# Patient Record
Sex: FEMALE | Race: WHITE | NOT HISPANIC OR LATINO | Employment: OTHER | URBAN - METROPOLITAN AREA
[De-identification: names, ages, dates, MRNs, and addresses within clinical notes are randomized per-mention and may not be internally consistent; named-entity substitution may affect disease eponyms.]

---

## 2017-04-13 ENCOUNTER — TELEPHONE (OUTPATIENT)
Dept: UROLOGY | Facility: CLINIC | Age: 67
End: 2017-04-13

## 2017-04-13 NOTE — TELEPHONE ENCOUNTER
----- Message from Maki Hayes sent at 4/13/2017 12:22 PM CDT -----  New pt called stated that she is being referred by Dr. Teri Carmona/pt stated that she has blood in urine and trying to get in asap/did'nt know if I could schedule her with the NP because she's a new pt/pls call pt back to schedule [phone]

## 2017-04-13 NOTE — TELEPHONE ENCOUNTER
Attempted to contact patient no answer, unable able to leave message voicemail has not been set up

## 2017-04-18 ENCOUNTER — TELEPHONE (OUTPATIENT)
Dept: UROLOGY | Facility: CLINIC | Age: 67
End: 2017-04-18

## 2017-07-06 ENCOUNTER — OFFICE VISIT (OUTPATIENT)
Dept: HEMATOLOGY/ONCOLOGY | Facility: CLINIC | Age: 67
End: 2017-07-06
Payer: MEDICARE

## 2017-07-06 VITALS
WEIGHT: 237 LBS | TEMPERATURE: 98 F | RESPIRATION RATE: 18 BRPM | DIASTOLIC BLOOD PRESSURE: 86 MMHG | BODY MASS INDEX: 43.35 KG/M2 | SYSTOLIC BLOOD PRESSURE: 122 MMHG

## 2017-07-06 DIAGNOSIS — I26.99 OTHER ACUTE PULMONARY EMBOLISM WITHOUT ACUTE COR PULMONALE: Primary | ICD-10-CM

## 2017-07-06 DIAGNOSIS — D68.59 HYPERCOAGULATION SYNDROME: ICD-10-CM

## 2017-07-06 PROCEDURE — 1125F AMNT PAIN NOTED PAIN PRSNT: CPT | Mod: ,,, | Performed by: INTERNAL MEDICINE

## 2017-07-06 PROCEDURE — 99204 OFFICE O/P NEW MOD 45 MIN: CPT | Mod: ,,, | Performed by: INTERNAL MEDICINE

## 2017-07-06 PROCEDURE — 1159F MED LIST DOCD IN RCRD: CPT | Mod: ,,, | Performed by: INTERNAL MEDICINE

## 2017-07-06 NOTE — LETTER
July 7, 2017      Raquel Carmona, ALKA  [address]  [place] [address]           [facility]  [address] [place], [place] [address] [place] [address]          Patient: Leyda Bravo   MR Number: [id number]   YOB: 1950   Date of Visit: 7/6/2017       Dear Raquel Carmona:    Thank you for referring Leyda Bravo to me for evaluation. Attached you will find relevant portions of my assessment and plan of care.    If you have questions, please do not hesitate to call me. I look forward to following Leyda Bravo along with you.    Sincerely,    JUDSON Jacob MD    Enclosure  CC:  No Recipients    If you would like to receive this communication electronically, please contact [email or web address] or [phone] to request more information on [employer] access.    For providers and/or their staff who would like to refer a patient to [facility], please contact us through our one-stop-shop provider referral line, Darya Vazquez, at [phone].    If you feel you have received this communication in error or would no longer like to receive these types of communications, please e-mail [email or web address]

## 2017-07-07 NOTE — PROGRESS NOTES
JUDSON Jacob MD Physician Addendum   Consults       Expand All Collapse All    []Hide copied text  []Hover for attribution information                                                                                                                [facility] History & Physical     Subjective:      Patient ID:   Leyda Bravo  67 y.o. female   José Miguel,HEIDI        Chief Complaint:   PE hx, coag eval.     HPI:  67 y.o. female with diagnosis of colitis, had total colectomy at [facility] , course complicated by PE.     Hernia repair 2016, [facility], Dr. Spann, wound dehised.  Complicated by PE.     Presently on coumadin, Ms. Forde regulates it.     Hx also of C section x's 1, cholycystectomy, L nephrectomy for stones , hernia repair x's 2.  M0.     Hx HTN +, heart dx per Dr. Gama, arthritis knees, hips, ankles.     Skin cancer removed from face.  Has skin cancer at L shoulder, to be removed.  No MI, CVA hx.     Smoked age [age], appproximately 20 years, 1/2 ppd.  ETOH no.  Allergy no.  Job garment .     Sister x's 2, miscarried.  No family hx PE, DVT.  11 BR and Sis.  Heart dx colitis, DM, renal dx.  3 daughters. 1 ovarian cancer, 1 anemic, 1 A & W.  Mom heart dx.                   ROS:   GEN: normal without any fever, night sweats or weight loss  HEENT: normal with no HA's, sore throat, stiff neck, changes in vision  CV: normal with no CP, SOB, PND, GROVE or orthopnea  PULM: See HPI.   no SOB, cough, hemoptysis, sputum or pleuritic pain  GI: See HPI.  Has ostomy  : normal with no hematuria, dysuria  BREAST: normal with no mass, discharge, pain  SKIN: normal with no rash, erythema, bruising, or swelling          Past Medical History:   Diagnosis Date    Hypertension      Thyroid disease       hypothyroidism            Past Surgical History:   Procedure Laterality Date     SECTION        CHOLECYSTECTOMY        COLON SURGERY         colostomy    HERNIA REPAIR        KIDNEY SURGERY Left       removal         Review of patient's allergies indicates:  No Known Allergies  Social History   Social History            Social History    Marital status:        Spouse name: N/A    Number of children: N/A    Years of education: N/A          Occupational History    Not on file.           Social History Main Topics    Smoking status: Never Smoker    Smokeless tobacco: Never Used    Alcohol use No    Drug use: No    Sexual activity: Not on file           Other Topics Concern    Not on file          Social History Narrative    No narrative on file               Current Outpatient Prescriptions:     albuterol-ipratropium 2.5mg-0.5mg/3mL (DUO-NEB) 0.5 mg-3 mg(2.5 mg base)/3 mL nebulizer solution, Take 3 mLs by nebulization every 6 (six) hours while awake., Disp: 25 vial, Rfl: 0    enoxaparin (LOVENOX) 120 mg/0.8 mL Syrg, Inject 0.8 mLs (120 mg total) into the skin every 12 (twelve) hours., Disp: , Rfl:     levothyroxine (SYNTHROID) 100 MCG tablet, Take 1 tablet (100 mcg total) by mouth once daily., Disp: , Rfl:     NAPROXEN SODIUM (ALEVE ORAL), Take by mouth., Disp: , Rfl:     alum-mag hydroxide-simeth 225-200-25 mg/5 mL Susp, Take 30 mLs by mouth 4 (four) times daily as needed (heartburn)., Disp: , Rfl: 0    atenolol (TENORMIN) 25 MG tablet, Take 1 tablet (25 mg total) by mouth once daily., Disp: 30 tablet, Rfl: 11    clotrimazole (LOTRIMIN) 1 % cream, Apply topically 2 (two) times daily., Disp: , Rfl: 0    hydrocodone-acetaminophen 7.5-325mg (NORCO) 7.5-325 mg per tablet, Take 1 tablet by mouth every 4 (four) hours as needed., Disp: 30 tablet, Rfl: 0    metformin (GLUCOPHAGE-XR) 500 MG 24 hr tablet, Take 1 tablet (500 mg total) by mouth daily with breakfast., Disp: 90 tablet, Rfl: 3    pantoprazole (PROTONIX) 40 MG tablet, Take 1 tablet (40 mg total) by mouth once daily., Disp: 30 tablet, Rfl: 0    warfarin (COUMADIN) 5 MG tablet, Take 1 tablet (5 mg total) by mouth Daily., Disp: 30 tablet,  Rfl: 11              Objective:   Vitals:  Blood pressure 122/86, temperature 98.1 °F (36.7 °C), resp. rate 18, weight 107.5 kg (237 lb).     Physical Examination:   GEN: no apparent distress, comfortable; AAOx3  HEAD: atraumatic and normocephalic  EYES: no pallor, no icterus, PERRLA  ENT: OMM, no pharyngeal erythema, external ears WNL; no nasal discharge; no thrush, thyroid NP  NECK: no masses, thyroid normal, trachea midline, no LAD/LN's, supple  CV: RRR with no murmur; normal pulse; normal S1 and S2; no pedal edema  CHEST: Normal respiratory effort; CTAB; normal breath sounds; no wheeze or crackles, BS distant  ABDOM: nontender and nondistended; soft; normal bowel sounds; no rebound/guarding, L/S NP  MUSC/Skeletal: ROM normal; no crepitus; joints normal  EXTREM: no clubbing, cyanosis, inflammation or swelling, no edema.  L & R calf NT, no palpable venous cord  SKIN: no rashes, lesions, ulcers, petechiae or subcutaneous nodules  : no goodrich  NEURO: grossly intact; motor/sensory WNL; AAOx3; no tremors  PSYCH: normal mood, affect and behavior  LYMPH: normal cervical, supraclavicular, axillary and groin LN's  BREASTS: NT, no D/C, no palpable mass at L or R breast        Labs:         Lab Results   Component Value Date     WBC 6.00 07/22/2016     HGB 10.6 (L) 07/22/2016     HCT 31.0 (L) 07/22/2016     MCV 91 07/22/2016      07/22/2016    CMP        Sodium   Date Value Ref Range Status   07/19/2016 140 136 - 145 mmol/L Final            Potassium   Date Value Ref Range Status   07/19/2016 4.5 3.5 - 5.1 mmol/L Final            Chloride   Date Value Ref Range Status   07/19/2016 103 95 - 110 mmol/L Final            CO2   Date Value Ref Range Status   07/19/2016 26 23 - 29 mmol/L Final            Glucose   Date Value Ref Range Status   07/19/2016 126 (H) 70 - 110 mg/dL Final            BUN, Bld   Date Value Ref Range Status   07/19/2016 7 (L) 8 - 23 mg/dL Final            Creatinine   Date Value Ref Range Status    07/19/2016 0.8 0.5 - 1.4 mg/dL Final            Calcium   Date Value Ref Range Status   07/19/2016 9.0 8.7 - 10.5 mg/dL Final            Total Protein   Date Value Ref Range Status   07/19/2016 5.9 (L) 6.0 - 8.4 g/dL Final            Albumin   Date Value Ref Range Status   07/19/2016 1.9 (L) 3.5 - 5.2 g/dL Final              Total Bilirubin   Date Value Ref Range Status   07/19/2016 0.5 0.1 - 1.0 mg/dL Final       Comment:       For infants and newborns, interpretation of results should be based  on gestational age, weight and in agreement with clinical  observations.  Premature Infant recommended reference ranges:  Up to 24 hours.............<8.0 mg/dL  Up to 48 hours............<12.0 mg/dL  3-5 days..................<15.0 mg/dL  6-29 days.................<15.0 mg/dL            Alkaline Phosphatase   Date Value Ref Range Status   07/19/2016 77 55 - 135 U/L Final            AST   Date Value Ref Range Status   07/19/2016 22 10 - 40 U/L Final            ALT   Date Value Ref Range Status   07/19/2016 20 10 - 44 U/L Final            Anion Gap   Date Value Ref Range Status   07/19/2016 11 8 - 16 mmol/L Final            eGFR if    Date Value Ref Range Status   07/19/2016 >60 >60 mL/min/1.73 m^2 Final              eGFR if non    Date Value Ref Range Status   07/19/2016 >60 >60 mL/min/1.73 m^2 Final       Comment:       Calculation used to obtain the estimated glomerular filtration  rate (eGFR) is the CKD-EPI equation. Since race is unknown   in our information system, the eGFR values for   -American and Non--American patients are given   for each creatinine result.      I have reviewed all available lab results and radiology reports.     Radiology/Diagnostic Studies:              Assessment:   (1) 67 y.o. female with diagnosis of niya operative PE x's 2, and family hx miscarriage.  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  She stopped her coumadin on her on 4/2017.  Was  scared to continue it because of hematuria and ostomy bleeding while on it.     (2)Hypercoagulation evaluation ordered for .       RTC 2-3 weeks to review the results.               Plan:       RTC 2-3 weeks.  Check hypercoag lab HH.        I have explained and the patient understands all of  the current recommendation(s). I have answered all of their questions to the best of my ability and to their complete satisfaction.                Thank you for allowing me to participate in this pleasant patient's care. Please call with any questions or concerns.  Copy of note forwarded to Ms. Vale,  Pt has orders, to go to  today for lab.      Revision History

## 2017-07-07 NOTE — CONSULTS
[facility] History & Physical    Subjective:      Patient ID:   Leyda Bravo  67 y.o. female   José Miguel,HEIDI      Chief Complaint:   PE hx, coag eval.    HPI:  67 y.o. female with diagnosis of colitis, had total colectomy at [facility] , course complicated by PE.    Hernia repair 2016, [facility], Dr. Spann, wound dehised.  Complicated by PE.    Presently on coumadin, Ms. Forde regulates it.    Hx also of C section x's 1, cholycystectomy, L nephrectomy for stones , hernia repair x's 2.  M0.    Hx HTN +, heart dx per Dr. Gama, arthritis knees, hips, ankles.    Skin cancer removed from face.  Has skin cancer at L shoulder, to be removed.  No MI, CVA hx.    Smoked age [age], appproximately 20 years, 1/2 ppd.  ETOH no.  Allergy no.  Job garment .    Sister x's 2, miscarried.  No family hx PE, DVT.  11 BR and Sis.  Heart dx colitis, DM, renal dx.  3 daughters. 1 ovarian cancer, 1 anemic, 1 A & W.  Mom heart dx.              ROS:   GEN: normal without any fever, night sweats or weight loss  HEENT: normal with no HA's, sore throat, stiff neck, changes in vision  CV: normal with no CP, SOB, PND, GROVE or orthopnea  PULM: See HPI.   no SOB, cough, hemoptysis, sputum or pleuritic pain  GI: See HPI.  Has ostomy  : normal with no hematuria, dysuria  BREAST: normal with no mass, discharge, pain  SKIN: normal with no rash, erythema, bruising, or swelling     Past Medical History:   Diagnosis Date    Hypertension     Thyroid disease     hypothyroidism     Past Surgical History:   Procedure Laterality Date     SECTION      CHOLECYSTECTOMY      COLON SURGERY      colostomy    HERNIA REPAIR      KIDNEY SURGERY Left 2006    removal       Review of patient's allergies indicates:  No Known Allergies  Social History     Social History    Marital status:      Spouse name: N/A    Number of children: N/A    Years of education: N/A     Occupational History    Not on file.     Social History Main Topics     Smoking status: Never Smoker    Smokeless tobacco: Never Used    Alcohol use No    Drug use: No    Sexual activity: Not on file     Other Topics Concern    Not on file     Social History Narrative    No narrative on file         Current Outpatient Prescriptions:     albuterol-ipratropium 2.5mg-0.5mg/3mL (DUO-NEB) 0.5 mg-3 mg(2.5 mg base)/3 mL nebulizer solution, Take 3 mLs by nebulization every 6 (six) hours while awake., Disp: 25 vial, Rfl: 0    enoxaparin (LOVENOX) 120 mg/0.8 mL Syrg, Inject 0.8 mLs (120 mg total) into the skin every 12 (twelve) hours., Disp: , Rfl:     levothyroxine (SYNTHROID) 100 MCG tablet, Take 1 tablet (100 mcg total) by mouth once daily., Disp: , Rfl:     NAPROXEN SODIUM (ALEVE ORAL), Take by mouth., Disp: , Rfl:     alum-mag hydroxide-simeth 225-200-25 mg/5 mL Susp, Take 30 mLs by mouth 4 (four) times daily as needed (heartburn)., Disp: , Rfl: 0    atenolol (TENORMIN) 25 MG tablet, Take 1 tablet (25 mg total) by mouth once daily., Disp: 30 tablet, Rfl: 11    clotrimazole (LOTRIMIN) 1 % cream, Apply topically 2 (two) times daily., Disp: , Rfl: 0    hydrocodone-acetaminophen 7.5-325mg (NORCO) 7.5-325 mg per tablet, Take 1 tablet by mouth every 4 (four) hours as needed., Disp: 30 tablet, Rfl: 0    metformin (GLUCOPHAGE-XR) 500 MG 24 hr tablet, Take 1 tablet (500 mg total) by mouth daily with breakfast., Disp: 90 tablet, Rfl: 3    pantoprazole (PROTONIX) 40 MG tablet, Take 1 tablet (40 mg total) by mouth once daily., Disp: 30 tablet, Rfl: 0    warfarin (COUMADIN) 5 MG tablet, Take 1 tablet (5 mg total) by mouth Daily., Disp: 30 tablet, Rfl: 11          Objective:   Vitals:  Blood pressure 122/86, temperature 98.1 °F (36.7 °C), resp. rate 18, weight 107.5 kg (237 lb).    Physical Examination:   GEN: no apparent distress, comfortable; AAOx3  HEAD: atraumatic and normocephalic  EYES: no pallor, no icterus, PERRLA  ENT: OMM, no pharyngeal erythema, external ears WNL; no nasal  discharge; no thrush, thyroid NP  NECK: no masses, thyroid normal, trachea midline, no LAD/LN's, supple  CV: RRR with no murmur; normal pulse; normal S1 and S2; no pedal edema  CHEST: Normal respiratory effort; CTAB; normal breath sounds; no wheeze or crackles, BS distant  ABDOM: nontender and nondistended; soft; normal bowel sounds; no rebound/guarding, L/S NP  MUSC/Skeletal: ROM normal; no crepitus; joints normal  EXTREM: no clubbing, cyanosis, inflammation or swelling, no edema.  L & R calf NT, no palpable venous cord  SKIN: no rashes, lesions, ulcers, petechiae or subcutaneous nodules  : no goodrich  NEURO: grossly intact; motor/sensory WNL; AAOx3; no tremors  PSYCH: normal mood, affect and behavior  LYMPH: normal cervical, supraclavicular, axillary and groin LN's  BREASTS: NT, no D/C, no palpable mass at L or R breast      Labs:   Lab Results   Component Value Date    WBC 6.00 07/22/2016    HGB 10.6 (L) 07/22/2016    HCT 31.0 (L) 07/22/2016    MCV 91 07/22/2016     07/22/2016    CMP  Sodium   Date Value Ref Range Status   07/19/2016 140 136 - 145 mmol/L Final     Potassium   Date Value Ref Range Status   07/19/2016 4.5 3.5 - 5.1 mmol/L Final     Chloride   Date Value Ref Range Status   07/19/2016 103 95 - 110 mmol/L Final     CO2   Date Value Ref Range Status   07/19/2016 26 23 - 29 mmol/L Final     Glucose   Date Value Ref Range Status   07/19/2016 126 (H) 70 - 110 mg/dL Final     BUN, Bld   Date Value Ref Range Status   07/19/2016 7 (L) 8 - 23 mg/dL Final     Creatinine   Date Value Ref Range Status   07/19/2016 0.8 0.5 - 1.4 mg/dL Final     Calcium   Date Value Ref Range Status   07/19/2016 9.0 8.7 - 10.5 mg/dL Final     Total Protein   Date Value Ref Range Status   07/19/2016 5.9 (L) 6.0 - 8.4 g/dL Final     Albumin   Date Value Ref Range Status   07/19/2016 1.9 (L) 3.5 - 5.2 g/dL Final     Total Bilirubin   Date Value Ref Range Status   07/19/2016 0.5 0.1 - 1.0 mg/dL Final     Comment:     For  infants and newborns, interpretation of results should be based  on gestational age, weight and in agreement with clinical  observations.  Premature Infant recommended reference ranges:  Up to 24 hours.............<8.0 mg/dL  Up to 48 hours............<12.0 mg/dL  3-5 days..................<15.0 mg/dL  6-29 days.................<15.0 mg/dL       Alkaline Phosphatase   Date Value Ref Range Status   07/19/2016 77 55 - 135 U/L Final     AST   Date Value Ref Range Status   07/19/2016 22 10 - 40 U/L Final     ALT   Date Value Ref Range Status   07/19/2016 20 10 - 44 U/L Final     Anion Gap   Date Value Ref Range Status   07/19/2016 11 8 - 16 mmol/L Final     eGFR if    Date Value Ref Range Status   07/19/2016 >60 >60 mL/min/1.73 m^2 Final     eGFR if non    Date Value Ref Range Status   07/19/2016 >60 >60 mL/min/1.73 m^2 Final     Comment:     Calculation used to obtain the estimated glomerular filtration  rate (eGFR) is the CKD-EPI equation. Since race is unknown   in our information system, the eGFR values for   -American and Non--American patients are given   for each creatinine result.       I have reviewed all available lab results and radiology reports.    Radiology/Diagnostic Studies:          Assessment:   (1) 67 y.o. female with diagnosis of niya operative PE x's 2, and family hx miscarriage.  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  She stopped her coumadin on her on 4/2017.  Was scared to continue it because of hematuria and ostomy bleeding while on it.    (2)Hypercoagulation evaluation ordered for HH.       RTC 2-3 weeks to review the results.            Plan:       RTC 2-3 weeks.  Check hypercoag lab HH.      I have explained and the patient understands all of  the current recommendation(s). I have answered all of their questions to the best of my ability and to their complete satisfaction.             Thank you for allowing me to participate in this  pleasant patient's care. Please call with any questions or concerns.  Copy of note forwarded to Ms. Vale,  Pt has orders, to go to  today for lab.

## 2017-07-27 ENCOUNTER — OFFICE VISIT (OUTPATIENT)
Dept: HEMATOLOGY/ONCOLOGY | Facility: CLINIC | Age: 67
End: 2017-07-27
Payer: MEDICARE

## 2017-07-27 VITALS
RESPIRATION RATE: 20 BRPM | BODY MASS INDEX: 44.26 KG/M2 | WEIGHT: 242 LBS | SYSTOLIC BLOOD PRESSURE: 148 MMHG | TEMPERATURE: 99 F | DIASTOLIC BLOOD PRESSURE: 80 MMHG | HEART RATE: 80 BPM

## 2017-07-27 DIAGNOSIS — D68.59 HYPERCOAGULATION SYNDROME: ICD-10-CM

## 2017-07-27 DIAGNOSIS — Z15.89 HOMOZYGOUS FOR MTHFR GENE MUTATION: ICD-10-CM

## 2017-07-27 DIAGNOSIS — I26.99 OTHER ACUTE PULMONARY EMBOLISM WITHOUT ACUTE COR PULMONALE: Primary | ICD-10-CM

## 2017-07-27 DIAGNOSIS — R79.89 INCREASED HOMOCYSTEINE: ICD-10-CM

## 2017-07-27 PROCEDURE — 99214 OFFICE O/P EST MOD 30 MIN: CPT | Mod: ,,, | Performed by: INTERNAL MEDICINE

## 2017-07-27 PROCEDURE — 3008F BODY MASS INDEX DOCD: CPT | Mod: ,,, | Performed by: INTERNAL MEDICINE

## 2017-07-27 PROCEDURE — 1159F MED LIST DOCD IN RCRD: CPT | Mod: ,,, | Performed by: INTERNAL MEDICINE

## 2017-07-27 NOTE — PROGRESS NOTES
R Sumanth Jacob MD Physician Addendum   Consults       Expand All Collapse All    []Hide copied text  []Hover for attribution information                                                                                                                [facility] History & Physical     Subjective:      Patient ID:   Leyda Bravo  67 y.o. female   Owosso,HEIDI        Chief Complaint:   PE hx, coag eval.     HPI:  67 y.o. female with diagnosis of colitis, had total colectomy at [facility] , course complicated by PE.     Hernia repair 2016, [facility], Dr. Spann, wound dehised.  Complicated by PE.     Presently on coumadin, Ms. Forde regulates it.     Hx also of C section x's 1, cholycystectomy, L nephrectomy for stones , hernia repair x's 2.  M0.     Hx HTN +, heart dx per Dr. Gama, arthritis knees, hips, ankles.     Skin cancer removed from face.  Has skin cancer at L shoulder, to be removed.  No MI, CVA hx.     Smoked age [age], appproximately 20 years, 1/2 ppd.  ETOH no.  Allergy no.  Job garment .     Sister x's 2, miscarried.  No family hx PE, DVT.  11 BR and Sis.  Heart dx colitis, DM, renal dx.  3 daughters. 1 ovarian cancer, 1 anemic, 1 A & W.  Mom heart dx.        Hypercoagulation Coagulation results showed MTHFR mutation ++-- and       homocystene level at 22.     ROS:   GEN: normal without any fever, night sweats or weight loss  HEENT: normal with no HA's, sore throat, stiff neck, changes in vision  CV: See HPI.   no CP, SOB, PND, GROVE or orthopnea  PULM: See HPI.   no SOB, cough, hemoptysis, sputum or pleuritic pain  GI: See HPI.  Has ostomy  : See HPI.  BREAST: normal with no mass, discharge, pain  SKIN: normal with no rash, erythema, bruising, or swelling          Past Medical History:   Diagnosis Date    Hypertension      Thyroid disease       hypothyroidism            Past Surgical History:   Procedure Laterality Date     SECTION        CHOLECYSTECTOMY        COLON SURGERY          colostomy    HERNIA REPAIR        KIDNEY SURGERY Left 2006     removal         Review of patient's allergies indicates:  No Known Allergies  Social History   Social History            Social History    Marital status:        Spouse name: N/A    Number of children: N/A    Years of education: N/A          Occupational History    Not on file.           Social History Main Topics    Smoking status: Never Smoker    Smokeless tobacco: Never Used    Alcohol use No    Drug use: No    Sexual activity: Not on file           Other Topics Concern    Not on file          Social History Narrative    No narrative on file               Current Outpatient Prescriptions:     albuterol-ipratropium 2.5mg-0.5mg/3mL (DUO-NEB) 0.5 mg-3 mg(2.5 mg base)/3 mL nebulizer solution, Take 3 mLs by nebulization every 6 (six) hours while awake., Disp: 25 vial, Rfl: 0    enoxaparin (LOVENOX) 120 mg/0.8 mL Syrg, Inject 0.8 mLs (120 mg total) into the skin every 12 (twelve) hours., Disp: , Rfl:     levothyroxine (SYNTHROID) 100 MCG tablet, Take 1 tablet (100 mcg total) by mouth once daily., Disp: , Rfl:     NAPROXEN SODIUM (ALEVE ORAL), Take by mouth., Disp: , Rfl:     alum-mag hydroxide-simeth 225-200-25 mg/5 mL Susp, Take 30 mLs by mouth 4 (four) times daily as needed (heartburn)., Disp: , Rfl: 0    atenolol (TENORMIN) 25 MG tablet, Take 1 tablet (25 mg total) by mouth once daily., Disp: 30 tablet, Rfl: 11    clotrimazole (LOTRIMIN) 1 % cream, Apply topically 2 (two) times daily., Disp: , Rfl: 0    hydrocodone-acetaminophen 7.5-325mg (NORCO) 7.5-325 mg per tablet, Take 1 tablet by mouth every 4 (four) hours as needed., Disp: 30 tablet, Rfl: 0    metformin (GLUCOPHAGE-XR) 500 MG 24 hr tablet, Take 1 tablet (500 mg total) by mouth daily with breakfast., Disp: 90 tablet, Rfl: 3    pantoprazole (PROTONIX) 40 MG tablet, Take 1 tablet (40 mg total) by mouth once daily., Disp: 30 tablet, Rfl: 0    warfarin (COUMADIN) 5 MG  tablet, Take 1 tablet (5 mg total) by mouth Daily., Disp: 30 tablet, Rfl: 11              Objective:   Vitals:  Blood pressure 122/86, temperature 98.1 °F (36.7 °C), resp. rate 18, weight 107.5 kg (237 lb).     Physical Examination:   GEN: no apparent distress, comfortable; AAOx3  HEAD: atraumatic and normocephalic  EYES: no pallor, no icterus, PERRLA  ENT: OMM, no pharyngeal erythema,  no nasal discharge; no thrush, thyroid NP  NECK: no masses, thyroid  Not palpable, no LAD/LN's, supple  CV: RRR with no murmur; normal pulse; no pedal edema  CHEST: Normal respiratory effort; CTAB; normal breath sounds; no wheeze or crackles, BS distant  ABDOM: nontender and nondistended; soft; normal bowel sounds; no rebound/guarding, L/S NP  MUSC/Skeletal: ROM normal; no crepitus; joints normal  EXTREM: no clubbing, cyanosis, inflammation or swelling, no edema.  L & R calf NT, no palpable venous cord  SKIN: no rashes, lesions, ulcers, petechiae   : no goodrich  NEURO: grossly intact; motor/sensory WNL; AAOx3; no tremors  PSYCH: normal mood, affect and behavior  LYMPH: normal cervical, supraclavicular, axillary and groin LN's  BREASTS: NT, no D/C, no palpable mass at L or R breast        Labs:         Lab Results   Component Value Date     WBC 6.00 07/22/2016     HGB 10.6 (L) 07/22/2016     HCT 31.0 (L) 07/22/2016     MCV 91 07/22/2016      07/22/2016    CMP        Sodium   Date Value Ref Range Status   07/19/2016 140 136 - 145 mmol/L Final            Potassium   Date Value Ref Range Status   07/19/2016 4.5 3.5 - 5.1 mmol/L Final            Chloride   Date Value Ref Range Status   07/19/2016 103 95 - 110 mmol/L Final            CO2   Date Value Ref Range Status   07/19/2016 26 23 - 29 mmol/L Final            Glucose   Date Value Ref Range Status   07/19/2016 126 (H) 70 - 110 mg/dL Final            BUN, Bld   Date Value Ref Range Status   07/19/2016 7 (L) 8 - 23 mg/dL Final            Creatinine   Date Value Ref Range Status    07/19/2016 0.8 0.5 - 1.4 mg/dL Final            Calcium   Date Value Ref Range Status   07/19/2016 9.0 8.7 - 10.5 mg/dL Final            Total Protein   Date Value Ref Range Status   07/19/2016 5.9 (L) 6.0 - 8.4 g/dL Final            Albumin   Date Value Ref Range Status   07/19/2016 1.9 (L) 3.5 - 5.2 g/dL Final              Total Bilirubin   Date Value Ref Range Status   07/19/2016 0.5 0.1 - 1.0 mg/dL Final       Comment:       For infants and newborns, interpretation of results should be based  on gestational age, weight and in agreement with clinical  observations.  Premature Infant recommended reference ranges:  Up to 24 hours.............<8.0 mg/dL  Up to 48 hours............<12.0 mg/dL  3-5 days..................<15.0 mg/dL  6-29 days.................<15.0 mg/dL            Alkaline Phosphatase   Date Value Ref Range Status   07/19/2016 77 55 - 135 U/L Final            AST   Date Value Ref Range Status   07/19/2016 22 10 - 40 U/L Final            ALT   Date Value Ref Range Status   07/19/2016 20 10 - 44 U/L Final            Anion Gap   Date Value Ref Range Status   07/19/2016 11 8 - 16 mmol/L Final            eGFR if    Date Value Ref Range Status   07/19/2016 >60 >60 mL/min/1.73 m^2 Final              eGFR if non    Date Value Ref Range Status   07/19/2016 >60 >60 mL/min/1.73 m^2 Final       Comment:       Calculation used to obtain the estimated glomerular filtration  rate (eGFR) is the CKD-EPI equation. Since race is unknown   in our information system, the eGFR values for   -American and Non--American patients are given   for each creatinine result.      I have reviewed all available lab results and radiology reports.     Radiology/Diagnostic Studies:              Assessment:   (1) 67 y.o. female with diagnosis of niya operative PE x's 2, and family hx miscarriage.   Familial hypercoagulation syndrome.  She stopped her coumadin on her on 4/2017.  Was scared to  continue it because of hematuria and ostomy bleeding while on it.     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  Discussed significance of MTHFR mutation, and roll of homocystene in DVT, PE, Vascular dx.   Discussed rationale of vitamen supplement to decrease homocystene.  Check lab 1/2018.  Potentially, may eventually be able to come off coumadin.  Give prophylaxis for surgery, hospitalizations.  Start Metanx daily.    (3)Other family members may also be affected.  Screening studies are MTHFR mutation, both types, and homocystene level.      RTC 1/2018.       Plan:       I have explained and the patient understands all of  the current recommendation(s). I have answered all of her questions to the best of my ability and to her complete satisfaction.        .      Revision History                              R Sumanth Jacob MD Physician Addendum   Consults       Expand All Collapse All    []Hide copied text  []Hover for attribution information                                                                                                                [facility] History & Physical     Subjective:                           Past Medical History:   Diagnosis Date                             Past Surgical History:   Procedure Laterality Date                                                        Social History            Social History    Marital status:        Spouse name:     Number of children:     Years of education:           Occupational History    Not on file.           Social History Main Topics    Smoking status:     Smokeless tobacco:     Alcohol use     Drug use:     Sexual activity:            Other Topics Concern              Social History Narrative                        Objective:           Labs:         Lab Results   Component Value Date                                              Sodium   Date Value Ref Range Status      Final             Potassium   Date Value Ref Range Status      Final            Chloride   Date Value Ref Range Status      Final            CO2   Date Value Ref Range Status      Final            Glucose   Date Value Ref Range Status      Final            BUN, Bld   Date Value Ref Range Status      Final            Creatinine   Date Value Ref Range Status      Final            Calcium   Date Value Ref Range Status      Final            Total Protein   Date Value Ref Range Status      Final            Albumin   Date Value Ref Range Status      Final              Total Bilirubin   Date Value Ref Range Status      Final       Comment:                   Alkaline Phosphatase   Date Value Ref Range Status      Final            AST   Date Value Ref Range Status      Final            ALT   Date Value Ref Range Status      Final            Anion Gap   Date Value Ref Range Status   07/19/2016 11 8 - 16 mmol/L Final            eGFR if    Date Value Ref Range Status      Final              eGFR if non    Date Value Ref Range Status      Final       Comment:               Radiology/Diagnostic Studies:              Assessment:                  Plan:         Revision History

## 2017-08-01 ENCOUNTER — TELEPHONE (OUTPATIENT)
Dept: HEMATOLOGY/ONCOLOGY | Facility: CLINIC | Age: 67
End: 2017-08-01

## 2017-08-01 NOTE — TELEPHONE ENCOUNTER
Pt called in to let us know that she could not afford the Metanx that Dr. Jacob prescribed even with coupon. Spoke with Dr. Jacob he said to take a Centrum Silver womens one a day vitamin. RAPHAEL.

## 2017-08-02 ENCOUNTER — TELEPHONE (OUTPATIENT)
Dept: HEMATOLOGY/ONCOLOGY | Facility: CLINIC | Age: 67
End: 2017-08-02

## 2017-08-02 NOTE — TELEPHONE ENCOUNTER
----- Message from Angie Fine LPN sent at 7/31/2017  2:01 PM CDT -----  Contact: garcía  Is there anything else you can give her?  ----- Message -----  From: Flora Atwood  Sent: 7/31/2017  12:02 PM  To: Cecil Julio Staff    Pt called and stated Metanax prescription is too expensive even with coupon so she is asking if there is anything that can be done. Please advise

## 2018-01-31 ENCOUNTER — TELEPHONE (OUTPATIENT)
Dept: HEMATOLOGY/ONCOLOGY | Facility: CLINIC | Age: 68
End: 2018-01-31

## 2018-01-31 NOTE — TELEPHONE ENCOUNTER
----- Message from Jose Alberto Burrows RN sent at 1/31/2018  3:13 PM CST -----      ----- Message -----  From: Violeta Norberto  Sent: 1/31/2018   3:01 PM  To: Cecil Julio Staff    Patient called in to cancel her appt for tomorrow. When I suggested to reschedule it she said that she would just like a nurse to call her with her BW results. I asked again to make sure that she did not wish to schedule another appt and she said no. I asked her if she wanted to discontinue care and she said it depends on the blood work. She said she didn't feel like she needed to see Dr. Jacob. Please advise and contact patient at [phone]. Thanks

## 2018-02-04 ENCOUNTER — TELEPHONE (OUTPATIENT)
Dept: HEMATOLOGY/ONCOLOGY | Facility: CLINIC | Age: 68
End: 2018-02-04

## 2018-02-07 NOTE — TELEPHONE ENCOUNTER
Tell her the homocystene level is coming down and is improving.  She was at 22 in the past but now she is at 14, better.  Stay on mutivitamen daily.  RTC see me 6 months with homocystene level.

## 2021-06-14 ENCOUNTER — HOSPITAL ENCOUNTER (INPATIENT)
Facility: HOSPITAL | Age: 71
LOS: 1 days | Discharge: HOME HEALTH | DRG: 301 | End: 2021-06-15
Attending: EMERGENCY MEDICINE | Admitting: SURGERY
Payer: MEDICARE

## 2021-06-14 ENCOUNTER — HOSPITAL ENCOUNTER (EMERGENCY)
Facility: HOSPITAL | Age: 71
Discharge: ANOTHER HOSPITAL | End: 2021-06-14
Attending: EMERGENCY MEDICINE
Payer: MEDICARE

## 2021-06-14 VITALS
TEMPERATURE: 99 F | WEIGHT: 240 LBS | HEIGHT: 62 IN | SYSTOLIC BLOOD PRESSURE: 174 MMHG | RESPIRATION RATE: 19 BRPM | OXYGEN SATURATION: 97 % | HEART RATE: 102 BPM | DIASTOLIC BLOOD PRESSURE: 81 MMHG | BODY MASS INDEX: 44.16 KG/M2

## 2021-06-14 DIAGNOSIS — I82.409 DVT (DEEP VENOUS THROMBOSIS): ICD-10-CM

## 2021-06-14 DIAGNOSIS — R06.02 SHORTNESS OF BREATH: ICD-10-CM

## 2021-06-14 DIAGNOSIS — I82.413 ACUTE DEEP VEIN THROMBOSIS (DVT) OF FEMORAL VEIN OF BOTH LOWER EXTREMITIES: Primary | ICD-10-CM

## 2021-06-14 DIAGNOSIS — K86.89 PANCREATIC MASS: ICD-10-CM

## 2021-06-14 DIAGNOSIS — M79.606 LEG PAIN: ICD-10-CM

## 2021-06-14 DIAGNOSIS — I80.203: Primary | ICD-10-CM

## 2021-06-14 LAB
ALBUMIN SERPL BCP-MCNC: 3.3 G/DL (ref 3.5–5.2)
ALP SERPL-CCNC: 65 U/L (ref 55–135)
ALT SERPL W/O P-5'-P-CCNC: 35 U/L (ref 10–44)
ANION GAP SERPL CALC-SCNC: 12 MMOL/L (ref 8–16)
APTT PPP: 30.6 SEC (ref 25.6–35.8)
AST SERPL-CCNC: 31 U/L (ref 10–40)
BASOPHILS # BLD AUTO: 0.05 K/UL (ref 0–0.2)
BASOPHILS NFR BLD: 0.5 % (ref 0–1.9)
BILIRUB SERPL-MCNC: 1.1 MG/DL (ref 0.1–1)
BNP SERPL-MCNC: 32 PG/ML (ref 0–99)
BUN SERPL-MCNC: 16 MG/DL (ref 8–23)
CALCIUM SERPL-MCNC: 9.4 MG/DL (ref 8.7–10.5)
CHLORIDE SERPL-SCNC: 102 MMOL/L (ref 95–110)
CO2 SERPL-SCNC: 23 MMOL/L (ref 23–29)
CREAT SERPL-MCNC: 0.9 MG/DL (ref 0.5–1.4)
DIFFERENTIAL METHOD: ABNORMAL
EOSINOPHIL # BLD AUTO: 0.3 K/UL (ref 0–0.5)
EOSINOPHIL NFR BLD: 2.9 % (ref 0–8)
ERYTHROCYTE [DISTWIDTH] IN BLOOD BY AUTOMATED COUNT: 13.4 % (ref 11.5–14.5)
EST. GFR  (AFRICAN AMERICAN): >60 ML/MIN/1.73 M^2
EST. GFR  (NON AFRICAN AMERICAN): >60 ML/MIN/1.73 M^2
GLUCOSE SERPL-MCNC: 124 MG/DL (ref 70–110)
HCT VFR BLD AUTO: 37.5 % (ref 37–48.5)
HGB BLD-MCNC: 12.2 G/DL (ref 12–16)
IMM GRANULOCYTES # BLD AUTO: 0.08 K/UL (ref 0–0.04)
IMM GRANULOCYTES NFR BLD AUTO: 0.8 % (ref 0–0.5)
INR PPP: 1.3
LYMPHOCYTES # BLD AUTO: 1.5 K/UL (ref 1–4.8)
LYMPHOCYTES NFR BLD: 15.5 % (ref 18–48)
MCH RBC QN AUTO: 31.5 PG (ref 27–31)
MCHC RBC AUTO-ENTMCNC: 32.5 G/DL (ref 32–36)
MCV RBC AUTO: 97 FL (ref 82–98)
MONOCYTES # BLD AUTO: 1.1 K/UL (ref 0.3–1)
MONOCYTES NFR BLD: 11.2 % (ref 4–15)
NEUTROPHILS # BLD AUTO: 6.8 K/UL (ref 1.8–7.7)
NEUTROPHILS NFR BLD: 69.1 % (ref 38–73)
NRBC BLD-RTO: 0 /100 WBC
PLATELET # BLD AUTO: 173 K/UL (ref 150–450)
PMV BLD AUTO: 9.7 FL (ref 9.2–12.9)
POTASSIUM SERPL-SCNC: 4.6 MMOL/L (ref 3.5–5.1)
PROT SERPL-MCNC: 7.1 G/DL (ref 6–8.4)
PROTHROMBIN TIME: 15.6 SEC (ref 11.8–14.3)
RBC # BLD AUTO: 3.87 M/UL (ref 4–5.4)
SARS-COV-2 RDRP RESP QL NAA+PROBE: NEGATIVE
SODIUM SERPL-SCNC: 137 MMOL/L (ref 136–145)
TROPONIN I SERPL DL<=0.01 NG/ML-MCNC: <0.03 NG/ML
WBC # BLD AUTO: 9.9 K/UL (ref 3.9–12.7)

## 2021-06-14 PROCEDURE — 96365 THER/PROPH/DIAG IV INF INIT: CPT

## 2021-06-14 PROCEDURE — 99285 EMERGENCY DEPT VISIT HI MDM: CPT | Mod: 25

## 2021-06-14 PROCEDURE — 99285 PR EMERGENCY DEPT VISIT,LEVEL V: ICD-10-PCS | Mod: ,,, | Performed by: EMERGENCY MEDICINE

## 2021-06-14 PROCEDURE — 93010 ELECTROCARDIOGRAM REPORT: CPT | Mod: ,,, | Performed by: SPECIALIST

## 2021-06-14 PROCEDURE — 36415 COLL VENOUS BLD VENIPUNCTURE: CPT

## 2021-06-14 PROCEDURE — 93005 ELECTROCARDIOGRAM TRACING: CPT | Performed by: SPECIALIST

## 2021-06-14 PROCEDURE — 93010 EKG 12-LEAD: ICD-10-PCS | Mod: ,,, | Performed by: SPECIALIST

## 2021-06-14 PROCEDURE — 96374 THER/PROPH/DIAG INJ IV PUSH: CPT

## 2021-06-14 PROCEDURE — 85730 THROMBOPLASTIN TIME PARTIAL: CPT

## 2021-06-14 PROCEDURE — 83880 ASSAY OF NATRIURETIC PEPTIDE: CPT | Performed by: EMERGENCY MEDICINE

## 2021-06-14 PROCEDURE — 85610 PROTHROMBIN TIME: CPT

## 2021-06-14 PROCEDURE — 63600175 PHARM REV CODE 636 W HCPCS

## 2021-06-14 PROCEDURE — 80053 COMPREHEN METABOLIC PANEL: CPT | Performed by: EMERGENCY MEDICINE

## 2021-06-14 PROCEDURE — 25000003 PHARM REV CODE 250

## 2021-06-14 PROCEDURE — 84484 ASSAY OF TROPONIN QUANT: CPT | Performed by: EMERGENCY MEDICINE

## 2021-06-14 PROCEDURE — U0002 COVID-19 LAB TEST NON-CDC: HCPCS

## 2021-06-14 PROCEDURE — 99285 EMERGENCY DEPT VISIT HI MDM: CPT | Mod: ,,, | Performed by: EMERGENCY MEDICINE

## 2021-06-14 PROCEDURE — 99223 1ST HOSP IP/OBS HIGH 75: CPT | Mod: ,,, | Performed by: SURGERY

## 2021-06-14 PROCEDURE — 25500020 PHARM REV CODE 255: Performed by: EMERGENCY MEDICINE

## 2021-06-14 PROCEDURE — 85025 COMPLETE CBC W/AUTO DIFF WBC: CPT | Performed by: EMERGENCY MEDICINE

## 2021-06-14 PROCEDURE — 99223 PR INITIAL HOSPITAL CARE,LEVL III: ICD-10-PCS | Mod: ,,, | Performed by: SURGERY

## 2021-06-14 PROCEDURE — 63600175 PHARM REV CODE 636 W HCPCS: Performed by: PHYSICIAN ASSISTANT

## 2021-06-14 RX ORDER — HEPARIN SODIUM,PORCINE/D5W 25000/250
0-40 INTRAVENOUS SOLUTION INTRAVENOUS CONTINUOUS
Status: DISCONTINUED | OUTPATIENT
Start: 2021-06-14 | End: 2021-06-14 | Stop reason: HOSPADM

## 2021-06-14 RX ORDER — LEVOTHYROXINE SODIUM 112 UG/1
112 TABLET ORAL
COMMUNITY
End: 2022-11-28

## 2021-06-14 RX ORDER — HEPARIN SODIUM,PORCINE/D5W 25000/250
0-40 INTRAVENOUS SOLUTION INTRAVENOUS CONTINUOUS
Status: DISCONTINUED | OUTPATIENT
Start: 2021-06-14 | End: 2021-06-15

## 2021-06-14 RX ORDER — HYDROCODONE BITARTRATE AND ACETAMINOPHEN 5; 325 MG/1; MG/1
1 TABLET ORAL
Status: COMPLETED | OUTPATIENT
Start: 2021-06-14 | End: 2021-06-14

## 2021-06-14 RX ADMIN — HEPARIN SODIUM AND DEXTROSE 18 UNITS/KG/HR: 10000; 5 INJECTION INTRAVENOUS at 08:06

## 2021-06-14 RX ADMIN — HYDROCODONE BITARTRATE AND ACETAMINOPHEN 1 TABLET: 5; 325 TABLET ORAL at 06:06

## 2021-06-14 RX ADMIN — IOHEXOL 100 ML: 350 INJECTION, SOLUTION INTRAVENOUS at 06:06

## 2021-06-14 RX ADMIN — HEPARIN SODIUM AND DEXTROSE 18 UNITS/KG/HR: 10000; 5 INJECTION INTRAVENOUS at 11:06

## 2021-06-15 ENCOUNTER — TELEPHONE (OUTPATIENT)
Dept: VASCULAR SURGERY | Facility: CLINIC | Age: 71
End: 2021-06-15

## 2021-06-15 VITALS
HEIGHT: 62 IN | DIASTOLIC BLOOD PRESSURE: 55 MMHG | RESPIRATION RATE: 18 BRPM | BODY MASS INDEX: 44.22 KG/M2 | OXYGEN SATURATION: 96 % | HEART RATE: 75 BPM | WEIGHT: 240.31 LBS | SYSTOLIC BLOOD PRESSURE: 113 MMHG | TEMPERATURE: 98 F

## 2021-06-15 PROBLEM — I82.409 DVT (DEEP VENOUS THROMBOSIS): Status: ACTIVE | Noted: 2021-06-15

## 2021-06-15 LAB
APTT BLDCRRT: 31.6 SEC (ref 21–32)
APTT BLDCRRT: 31.9 SEC (ref 21–32)
APTT BLDCRRT: 58.6 SEC (ref 21–32)
BASOPHILS # BLD AUTO: 0.04 K/UL (ref 0–0.2)
BASOPHILS # BLD AUTO: 0.05 K/UL (ref 0–0.2)
BASOPHILS NFR BLD: 0.4 % (ref 0–1.9)
BASOPHILS NFR BLD: 0.7 % (ref 0–1.9)
DIFFERENTIAL METHOD: ABNORMAL
DIFFERENTIAL METHOD: ABNORMAL
EOSINOPHIL # BLD AUTO: 0.2 K/UL (ref 0–0.5)
EOSINOPHIL # BLD AUTO: 0.2 K/UL (ref 0–0.5)
EOSINOPHIL NFR BLD: 2.4 % (ref 0–8)
EOSINOPHIL NFR BLD: 3.1 % (ref 0–8)
ERYTHROCYTE [DISTWIDTH] IN BLOOD BY AUTOMATED COUNT: 13.3 % (ref 11.5–14.5)
ERYTHROCYTE [DISTWIDTH] IN BLOOD BY AUTOMATED COUNT: 13.3 % (ref 11.5–14.5)
HCT VFR BLD AUTO: 35.5 % (ref 37–48.5)
HCT VFR BLD AUTO: 36.3 % (ref 37–48.5)
HGB BLD-MCNC: 11.4 G/DL (ref 12–16)
HGB BLD-MCNC: 11.6 G/DL (ref 12–16)
IMM GRANULOCYTES # BLD AUTO: 0.06 K/UL (ref 0–0.04)
IMM GRANULOCYTES # BLD AUTO: 0.1 K/UL (ref 0–0.04)
IMM GRANULOCYTES NFR BLD AUTO: 0.8 % (ref 0–0.5)
IMM GRANULOCYTES NFR BLD AUTO: 1.1 % (ref 0–0.5)
INR PPP: 1 (ref 0.8–1.2)
LYMPHOCYTES # BLD AUTO: 1.2 K/UL (ref 1–4.8)
LYMPHOCYTES # BLD AUTO: 1.9 K/UL (ref 1–4.8)
LYMPHOCYTES NFR BLD: 15.4 % (ref 18–48)
LYMPHOCYTES NFR BLD: 20.8 % (ref 18–48)
MCH RBC QN AUTO: 30 PG (ref 27–31)
MCH RBC QN AUTO: 31.4 PG (ref 27–31)
MCHC RBC AUTO-ENTMCNC: 31.4 G/DL (ref 32–36)
MCHC RBC AUTO-ENTMCNC: 32.7 G/DL (ref 32–36)
MCV RBC AUTO: 96 FL (ref 82–98)
MCV RBC AUTO: 96 FL (ref 82–98)
MONOCYTES # BLD AUTO: 1 K/UL (ref 0.3–1)
MONOCYTES # BLD AUTO: 1 K/UL (ref 0.3–1)
MONOCYTES NFR BLD: 11 % (ref 4–15)
MONOCYTES NFR BLD: 13.2 % (ref 4–15)
NEUTROPHILS # BLD AUTO: 5.1 K/UL (ref 1.8–7.7)
NEUTROPHILS # BLD AUTO: 5.9 K/UL (ref 1.8–7.7)
NEUTROPHILS NFR BLD: 64.3 % (ref 38–73)
NEUTROPHILS NFR BLD: 66.8 % (ref 38–73)
NRBC BLD-RTO: 0 /100 WBC
NRBC BLD-RTO: 0 /100 WBC
PLATELET # BLD AUTO: 170 K/UL (ref 150–450)
PLATELET # BLD AUTO: 178 K/UL (ref 150–450)
PMV BLD AUTO: 9.3 FL (ref 9.2–12.9)
PMV BLD AUTO: 9.4 FL (ref 9.2–12.9)
POCT GLUCOSE: 134 MG/DL (ref 70–110)
PROTHROMBIN TIME: 10.8 SEC (ref 9–12.5)
RBC # BLD AUTO: 3.69 M/UL (ref 4–5.4)
RBC # BLD AUTO: 3.8 M/UL (ref 4–5.4)
WBC # BLD AUTO: 7.64 K/UL (ref 3.9–12.7)
WBC # BLD AUTO: 9.2 K/UL (ref 3.9–12.7)

## 2021-06-15 PROCEDURE — 97161 PT EVAL LOW COMPLEX 20 MIN: CPT

## 2021-06-15 PROCEDURE — 97530 THERAPEUTIC ACTIVITIES: CPT

## 2021-06-15 PROCEDURE — 25000003 PHARM REV CODE 250: Performed by: STUDENT IN AN ORGANIZED HEALTH CARE EDUCATION/TRAINING PROGRAM

## 2021-06-15 PROCEDURE — 99238 PR HOSPITAL DISCHARGE DAY,<30 MIN: ICD-10-PCS | Mod: ,,, | Performed by: SURGERY

## 2021-06-15 PROCEDURE — 85730 THROMBOPLASTIN TIME PARTIAL: CPT | Performed by: STUDENT IN AN ORGANIZED HEALTH CARE EDUCATION/TRAINING PROGRAM

## 2021-06-15 PROCEDURE — 63600175 PHARM REV CODE 636 W HCPCS: Performed by: STUDENT IN AN ORGANIZED HEALTH CARE EDUCATION/TRAINING PROGRAM

## 2021-06-15 PROCEDURE — 85025 COMPLETE CBC W/AUTO DIFF WBC: CPT | Performed by: STUDENT IN AN ORGANIZED HEALTH CARE EDUCATION/TRAINING PROGRAM

## 2021-06-15 PROCEDURE — 97165 OT EVAL LOW COMPLEX 30 MIN: CPT

## 2021-06-15 PROCEDURE — 96366 THER/PROPH/DIAG IV INF ADDON: CPT

## 2021-06-15 PROCEDURE — 85730 THROMBOPLASTIN TIME PARTIAL: CPT | Mod: 91 | Performed by: SURGERY

## 2021-06-15 PROCEDURE — 82962 GLUCOSE BLOOD TEST: CPT

## 2021-06-15 PROCEDURE — 36415 COLL VENOUS BLD VENIPUNCTURE: CPT | Performed by: SURGERY

## 2021-06-15 PROCEDURE — 20600001 HC STEP DOWN PRIVATE ROOM

## 2021-06-15 PROCEDURE — 94761 N-INVAS EAR/PLS OXIMETRY MLT: CPT

## 2021-06-15 PROCEDURE — 85610 PROTHROMBIN TIME: CPT | Performed by: STUDENT IN AN ORGANIZED HEALTH CARE EDUCATION/TRAINING PROGRAM

## 2021-06-15 PROCEDURE — 85730 THROMBOPLASTIN TIME PARTIAL: CPT | Mod: 91 | Performed by: STUDENT IN AN ORGANIZED HEALTH CARE EDUCATION/TRAINING PROGRAM

## 2021-06-15 PROCEDURE — 96366 THER/PROPH/DIAG IV INF ADDON: CPT | Performed by: EMERGENCY MEDICINE

## 2021-06-15 PROCEDURE — 85025 COMPLETE CBC W/AUTO DIFF WBC: CPT | Mod: 91 | Performed by: STUDENT IN AN ORGANIZED HEALTH CARE EDUCATION/TRAINING PROGRAM

## 2021-06-15 PROCEDURE — 99238 HOSP IP/OBS DSCHRG MGMT 30/<: CPT | Mod: ,,, | Performed by: SURGERY

## 2021-06-15 PROCEDURE — 94640 AIRWAY INHALATION TREATMENT: CPT

## 2021-06-15 PROCEDURE — 25000242 PHARM REV CODE 250 ALT 637 W/ HCPCS: Performed by: STUDENT IN AN ORGANIZED HEALTH CARE EDUCATION/TRAINING PROGRAM

## 2021-06-15 RX ORDER — ATENOLOL 25 MG/1
25 TABLET ORAL DAILY
Status: DISCONTINUED | OUTPATIENT
Start: 2021-06-15 | End: 2021-06-15 | Stop reason: HOSPADM

## 2021-06-15 RX ORDER — HEPARIN SODIUM,PORCINE/D5W 25000/250
0-40 INTRAVENOUS SOLUTION INTRAVENOUS CONTINUOUS
Status: ACTIVE | OUTPATIENT
Start: 2021-06-15 | End: 2021-06-15

## 2021-06-15 RX ORDER — IBUPROFEN 200 MG
24 TABLET ORAL
Status: DISCONTINUED | OUTPATIENT
Start: 2021-06-15 | End: 2021-06-15 | Stop reason: HOSPADM

## 2021-06-15 RX ORDER — APIXABAN 5 MG (74)
KIT ORAL
Qty: 74 TABLET | Refills: 2 | Status: SHIPPED | OUTPATIENT
Start: 2021-06-15 | End: 2022-11-28

## 2021-06-15 RX ORDER — MUPIROCIN 20 MG/G
OINTMENT TOPICAL 2 TIMES DAILY
Status: DISCONTINUED | OUTPATIENT
Start: 2021-06-15 | End: 2021-06-15 | Stop reason: HOSPADM

## 2021-06-15 RX ORDER — IPRATROPIUM BROMIDE AND ALBUTEROL SULFATE 2.5; .5 MG/3ML; MG/3ML
3 SOLUTION RESPIRATORY (INHALATION)
Status: DISCONTINUED | OUTPATIENT
Start: 2021-06-15 | End: 2021-06-15 | Stop reason: HOSPADM

## 2021-06-15 RX ORDER — GLUCAGON 1 MG
1 KIT INJECTION
Status: DISCONTINUED | OUTPATIENT
Start: 2021-06-15 | End: 2021-06-15 | Stop reason: HOSPADM

## 2021-06-15 RX ORDER — OXYCODONE HYDROCHLORIDE 5 MG/1
5 TABLET ORAL EVERY 6 HOURS PRN
Status: DISCONTINUED | OUTPATIENT
Start: 2021-06-15 | End: 2021-06-15 | Stop reason: HOSPADM

## 2021-06-15 RX ORDER — SODIUM CHLORIDE 0.9 % (FLUSH) 0.9 %
10 SYRINGE (ML) INJECTION
Status: DISCONTINUED | OUTPATIENT
Start: 2021-06-15 | End: 2021-06-15 | Stop reason: HOSPADM

## 2021-06-15 RX ORDER — IBUPROFEN 200 MG
16 TABLET ORAL
Status: DISCONTINUED | OUTPATIENT
Start: 2021-06-15 | End: 2021-06-15 | Stop reason: HOSPADM

## 2021-06-15 RX ORDER — LEVOTHYROXINE SODIUM 112 UG/1
112 TABLET ORAL
Status: DISCONTINUED | OUTPATIENT
Start: 2021-06-15 | End: 2021-06-15 | Stop reason: HOSPADM

## 2021-06-15 RX ORDER — ACETAMINOPHEN 325 MG/1
650 TABLET ORAL EVERY 6 HOURS
Status: DISCONTINUED | OUTPATIENT
Start: 2021-06-15 | End: 2021-06-15 | Stop reason: HOSPADM

## 2021-06-15 RX ORDER — INSULIN ASPART 100 [IU]/ML
1-10 INJECTION, SOLUTION INTRAVENOUS; SUBCUTANEOUS
Status: DISCONTINUED | OUTPATIENT
Start: 2021-06-15 | End: 2021-06-15 | Stop reason: HOSPADM

## 2021-06-15 RX ORDER — PANTOPRAZOLE SODIUM 40 MG/1
40 TABLET, DELAYED RELEASE ORAL DAILY
Status: DISCONTINUED | OUTPATIENT
Start: 2021-06-15 | End: 2021-06-15 | Stop reason: HOSPADM

## 2021-06-15 RX ORDER — MORPHINE SULFATE 4 MG/ML
4 INJECTION, SOLUTION INTRAMUSCULAR; INTRAVENOUS EVERY 4 HOURS PRN
Status: DISCONTINUED | OUTPATIENT
Start: 2021-06-15 | End: 2021-06-15 | Stop reason: HOSPADM

## 2021-06-15 RX ADMIN — APIXABAN 10 MG: 5 TABLET, FILM COATED ORAL at 09:06

## 2021-06-15 RX ADMIN — ATENOLOL 25 MG: 25 TABLET ORAL at 09:06

## 2021-06-15 RX ADMIN — ACETAMINOPHEN 650 MG: 325 TABLET ORAL at 11:06

## 2021-06-15 RX ADMIN — ACETAMINOPHEN 650 MG: 325 TABLET ORAL at 12:06

## 2021-06-15 RX ADMIN — MORPHINE SULFATE 4 MG: 4 INJECTION INTRAVENOUS at 02:06

## 2021-06-15 RX ADMIN — LEVOTHYROXINE SODIUM 112 MCG: 112 TABLET ORAL at 05:06

## 2021-06-15 RX ADMIN — IPRATROPIUM BROMIDE AND ALBUTEROL SULFATE 3 ML: .5; 2.5 SOLUTION RESPIRATORY (INHALATION) at 08:06

## 2021-06-15 RX ADMIN — PANTOPRAZOLE SODIUM 40 MG: 40 TABLET, DELAYED RELEASE ORAL at 09:06

## 2021-06-15 RX ADMIN — ACETAMINOPHEN 650 MG: 325 TABLET ORAL at 05:06

## 2021-06-16 PROCEDURE — G0180 MD CERTIFICATION HHA PATIENT: HCPCS | Mod: ,,, | Performed by: SURGERY

## 2021-06-16 PROCEDURE — G0180 PR HOME HEALTH MD CERTIFICATION: ICD-10-PCS | Mod: ,,, | Performed by: SURGERY

## 2021-06-17 ENCOUNTER — TELEPHONE (OUTPATIENT)
Dept: VASCULAR SURGERY | Facility: CLINIC | Age: 71
End: 2021-06-17

## 2021-06-22 ENCOUNTER — TELEPHONE (OUTPATIENT)
Dept: VASCULAR SURGERY | Facility: CLINIC | Age: 71
End: 2021-06-22

## 2021-06-22 ENCOUNTER — EXTERNAL HOME HEALTH (OUTPATIENT)
Dept: HOME HEALTH SERVICES | Facility: HOSPITAL | Age: 71
End: 2021-06-22
Payer: MEDICARE

## 2021-06-25 ENCOUNTER — OFFICE VISIT (OUTPATIENT)
Dept: HEMATOLOGY/ONCOLOGY | Facility: CLINIC | Age: 71
End: 2021-06-25
Payer: MEDICARE

## 2021-06-25 VITALS
BODY MASS INDEX: 46.16 KG/M2 | DIASTOLIC BLOOD PRESSURE: 88 MMHG | TEMPERATURE: 98 F | RESPIRATION RATE: 19 BRPM | HEART RATE: 93 BPM | WEIGHT: 252.38 LBS | SYSTOLIC BLOOD PRESSURE: 139 MMHG

## 2021-06-25 DIAGNOSIS — N63.20 MASS OF LEFT BREAST: Primary | ICD-10-CM

## 2021-06-25 DIAGNOSIS — K86.89 PANCREATIC MASS: ICD-10-CM

## 2021-06-25 DIAGNOSIS — Z12.31 BREAST CANCER SCREENING BY MAMMOGRAM: ICD-10-CM

## 2021-06-25 PROCEDURE — 1100F PTFALLS ASSESS-DOCD GE2>/YR: CPT | Mod: S$GLB,,, | Performed by: INTERNAL MEDICINE

## 2021-06-25 PROCEDURE — 1159F MED LIST DOCD IN RCRD: CPT | Mod: S$GLB,,, | Performed by: INTERNAL MEDICINE

## 2021-06-25 PROCEDURE — 3008F BODY MASS INDEX DOCD: CPT | Mod: S$GLB,,, | Performed by: INTERNAL MEDICINE

## 2021-06-25 PROCEDURE — 3288F PR FALLS RISK ASSESSMENT DOCUMENTED: ICD-10-PCS | Mod: S$GLB,,, | Performed by: INTERNAL MEDICINE

## 2021-06-25 PROCEDURE — 3008F PR BODY MASS INDEX (BMI) DOCUMENTED: ICD-10-PCS | Mod: S$GLB,,, | Performed by: INTERNAL MEDICINE

## 2021-06-25 PROCEDURE — 99204 PR OFFICE/OUTPT VISIT, NEW, LEVL IV, 45-59 MIN: ICD-10-PCS | Mod: S$GLB,,, | Performed by: INTERNAL MEDICINE

## 2021-06-25 PROCEDURE — 1159F PR MEDICATION LIST DOCUMENTED IN MEDICAL RECORD: ICD-10-PCS | Mod: S$GLB,,, | Performed by: INTERNAL MEDICINE

## 2021-06-25 PROCEDURE — 1125F AMNT PAIN NOTED PAIN PRSNT: CPT | Mod: S$GLB,,, | Performed by: INTERNAL MEDICINE

## 2021-06-25 PROCEDURE — 1125F PR PAIN SEVERITY QUANTIFIED, PAIN PRESENT: ICD-10-PCS | Mod: S$GLB,,, | Performed by: INTERNAL MEDICINE

## 2021-06-25 PROCEDURE — 1111F DSCHRG MED/CURRENT MED MERGE: CPT | Mod: S$GLB,,, | Performed by: INTERNAL MEDICINE

## 2021-06-25 PROCEDURE — 99204 OFFICE O/P NEW MOD 45 MIN: CPT | Mod: S$GLB,,, | Performed by: INTERNAL MEDICINE

## 2021-06-25 PROCEDURE — 1100F PR PT FALLS ASSESS DOC 2+ FALLS/FALL W/INJURY/YR: ICD-10-PCS | Mod: S$GLB,,, | Performed by: INTERNAL MEDICINE

## 2021-06-25 PROCEDURE — 1111F PR DISCHARGE MEDS RECONCILED W/ CURRENT OUTPATIENT MED LIST: ICD-10-PCS | Mod: S$GLB,,, | Performed by: INTERNAL MEDICINE

## 2021-06-25 PROCEDURE — 3288F FALL RISK ASSESSMENT DOCD: CPT | Mod: S$GLB,,, | Performed by: INTERNAL MEDICINE

## 2021-07-06 ENCOUNTER — DOCUMENT SCAN (OUTPATIENT)
Dept: HOME HEALTH SERVICES | Facility: HOSPITAL | Age: 71
End: 2021-07-06
Payer: MEDICARE

## 2021-07-07 ENCOUNTER — TELEPHONE (OUTPATIENT)
Dept: HEMATOLOGY/ONCOLOGY | Facility: CLINIC | Age: 71
End: 2021-07-07

## 2021-07-07 DIAGNOSIS — N63.20 UNSPECIFIED LUMP IN THE LEFT BREAST, UNSPECIFIED QUADRANT: ICD-10-CM

## 2021-07-07 DIAGNOSIS — N63.0 BREAST MASS IN FEMALE: ICD-10-CM

## 2021-07-07 DIAGNOSIS — N63.20 MASS OF LEFT BREAST: Primary | ICD-10-CM

## 2021-07-07 DIAGNOSIS — R92.8 OTHER ABNORMAL AND INCONCLUSIVE FINDINGS ON DIAGNOSTIC IMAGING OF BREAST: ICD-10-CM

## 2021-07-15 ENCOUNTER — OFFICE VISIT (OUTPATIENT)
Dept: HEMATOLOGY/ONCOLOGY | Facility: CLINIC | Age: 71
End: 2021-07-15
Payer: MEDICARE

## 2021-07-15 VITALS
TEMPERATURE: 98 F | WEIGHT: 239 LBS | HEART RATE: 100 BPM | BODY MASS INDEX: 43.71 KG/M2 | SYSTOLIC BLOOD PRESSURE: 153 MMHG | DIASTOLIC BLOOD PRESSURE: 88 MMHG

## 2021-07-15 DIAGNOSIS — I82.413 ACUTE DEEP VEIN THROMBOSIS (DVT) OF FEMORAL VEIN OF BOTH LOWER EXTREMITIES: Primary | ICD-10-CM

## 2021-07-15 DIAGNOSIS — K86.89 PANCREATIC MASS: ICD-10-CM

## 2021-07-15 DIAGNOSIS — N63.20 MASS OF LEFT BREAST: ICD-10-CM

## 2021-07-15 PROCEDURE — 99214 OFFICE O/P EST MOD 30 MIN: CPT | Mod: S$GLB,,, | Performed by: INTERNAL MEDICINE

## 2021-07-15 PROCEDURE — 99214 PR OFFICE/OUTPT VISIT, EST, LEVL IV, 30-39 MIN: ICD-10-PCS | Mod: S$GLB,,, | Performed by: INTERNAL MEDICINE

## 2021-08-05 ENCOUNTER — TELEPHONE (OUTPATIENT)
Dept: HEMATOLOGY/ONCOLOGY | Facility: CLINIC | Age: 71
End: 2021-08-05

## 2021-08-17 ENCOUNTER — TELEPHONE (OUTPATIENT)
Dept: HEMATOLOGY/ONCOLOGY | Facility: CLINIC | Age: 71
End: 2021-08-17

## 2021-08-23 DIAGNOSIS — N63.10 MASS OF RIGHT BREAST: Primary | ICD-10-CM

## 2021-08-23 DIAGNOSIS — N63.11 UNSPECIFIED LUMP IN THE RIGHT BREAST, UPPER OUTER QUADRANT: ICD-10-CM

## 2021-08-24 ENCOUNTER — TELEPHONE (OUTPATIENT)
Dept: HEMATOLOGY/ONCOLOGY | Facility: CLINIC | Age: 71
End: 2021-08-24

## 2021-08-27 ENCOUNTER — TELEPHONE (OUTPATIENT)
Dept: HEMATOLOGY/ONCOLOGY | Facility: CLINIC | Age: 71
End: 2021-08-27

## 2021-09-01 ENCOUNTER — TELEPHONE (OUTPATIENT)
Dept: HEMATOLOGY/ONCOLOGY | Facility: CLINIC | Age: 71
End: 2021-09-01

## 2021-09-15 ENCOUNTER — TELEPHONE (OUTPATIENT)
Dept: HEMATOLOGY/ONCOLOGY | Facility: CLINIC | Age: 71
End: 2021-09-15

## 2021-09-15 ENCOUNTER — OFFICE VISIT (OUTPATIENT)
Dept: HEMATOLOGY/ONCOLOGY | Facility: CLINIC | Age: 71
End: 2021-09-15
Payer: MEDICARE

## 2021-09-15 VITALS
DIASTOLIC BLOOD PRESSURE: 85 MMHG | BODY MASS INDEX: 43.27 KG/M2 | HEART RATE: 88 BPM | RESPIRATION RATE: 18 BRPM | SYSTOLIC BLOOD PRESSURE: 137 MMHG | TEMPERATURE: 98 F | WEIGHT: 236.63 LBS

## 2021-09-15 DIAGNOSIS — I82.409 RECURRENT DEEP VEIN THROMBOSIS (DVT): ICD-10-CM

## 2021-09-15 DIAGNOSIS — I82.513 CHRONIC EMBOLISM AND THROMBOSIS OF FEMORAL VEIN, BILATERAL: ICD-10-CM

## 2021-09-15 DIAGNOSIS — Z17.0 MALIGNANT NEOPLASM OF OVERLAPPING SITES OF RIGHT BREAST IN FEMALE, ESTROGEN RECEPTOR POSITIVE: Primary | ICD-10-CM

## 2021-09-15 DIAGNOSIS — C50.811 MALIGNANT NEOPLASM OF OVERLAPPING SITES OF RIGHT BREAST IN FEMALE, ESTROGEN RECEPTOR POSITIVE: Primary | ICD-10-CM

## 2021-09-15 DIAGNOSIS — Z80.41 FAMILY HISTORY OF OVARIAN CANCER: ICD-10-CM

## 2021-09-15 DIAGNOSIS — I26.99 MULTIPLE PULMONARY EMBOLI: ICD-10-CM

## 2021-09-15 PROCEDURE — 99214 PR OFFICE/OUTPT VISIT, EST, LEVL IV, 30-39 MIN: ICD-10-PCS | Mod: S$GLB,,, | Performed by: INTERNAL MEDICINE

## 2021-09-15 PROCEDURE — 99214 OFFICE O/P EST MOD 30 MIN: CPT | Mod: S$GLB,,, | Performed by: INTERNAL MEDICINE

## 2021-09-24 ENCOUNTER — TELEPHONE (OUTPATIENT)
Dept: HEMATOLOGY/ONCOLOGY | Facility: CLINIC | Age: 71
End: 2021-09-24

## 2021-09-24 DIAGNOSIS — Z17.0 MALIGNANT NEOPLASM OF OVERLAPPING SITES OF RIGHT BREAST IN FEMALE, ESTROGEN RECEPTOR POSITIVE: Primary | ICD-10-CM

## 2021-09-24 DIAGNOSIS — C50.811 MALIGNANT NEOPLASM OF OVERLAPPING SITES OF RIGHT BREAST IN FEMALE, ESTROGEN RECEPTOR POSITIVE: Primary | ICD-10-CM

## 2021-09-28 ENCOUNTER — HOSPITAL ENCOUNTER (OUTPATIENT)
Dept: RADIOLOGY | Facility: HOSPITAL | Age: 71
Discharge: HOME | End: 2021-09-28
Attending: INTERNAL MEDICINE
Payer: MEDICARE

## 2021-09-28 DIAGNOSIS — I82.513 CHRONIC EMBOLISM AND THROMBOSIS OF FEMORAL VEIN, BILATERAL: ICD-10-CM

## 2021-09-28 DIAGNOSIS — I26.99 MULTIPLE PULMONARY EMBOLI: ICD-10-CM

## 2021-09-28 DIAGNOSIS — I82.409 RECURRENT DEEP VEIN THROMBOSIS (DVT): ICD-10-CM

## 2021-09-28 LAB
CREAT SERPL-MCNC: 1 MG/DL (ref 0.5–1.4)
SAMPLE: NORMAL

## 2021-09-28 PROCEDURE — 25500020 PHARM REV CODE 255: Mod: PO | Performed by: INTERNAL MEDICINE

## 2021-09-28 PROCEDURE — 71275 CT ANGIOGRAPHY CHEST: CPT | Mod: TC,PO

## 2021-09-28 PROCEDURE — 93970 EXTREMITY STUDY: CPT | Mod: TC,PO

## 2021-09-28 PROCEDURE — 82565 ASSAY OF CREATININE: CPT | Mod: PO

## 2021-09-28 RX ADMIN — IOHEXOL 100 ML: 350 INJECTION, SOLUTION INTRAVENOUS at 11:09

## 2021-10-07 ENCOUNTER — OFFICE VISIT (OUTPATIENT)
Dept: HEMATOLOGY/ONCOLOGY | Facility: CLINIC | Age: 71
End: 2021-10-07
Payer: MEDICARE

## 2021-10-07 VITALS
RESPIRATION RATE: 16 BRPM | WEIGHT: 237.81 LBS | SYSTOLIC BLOOD PRESSURE: 115 MMHG | DIASTOLIC BLOOD PRESSURE: 75 MMHG | BODY MASS INDEX: 43.49 KG/M2 | HEART RATE: 76 BPM

## 2021-10-07 DIAGNOSIS — I82.409 RECURRENT DEEP VEIN THROMBOSIS (DVT): ICD-10-CM

## 2021-10-07 DIAGNOSIS — I82.513 CHRONIC EMBOLISM AND THROMBOSIS OF FEMORAL VEIN, BILATERAL: ICD-10-CM

## 2021-10-07 DIAGNOSIS — C50.811 MALIGNANT NEOPLASM OF OVERLAPPING SITES OF RIGHT BREAST IN FEMALE, ESTROGEN RECEPTOR POSITIVE: Primary | ICD-10-CM

## 2021-10-07 DIAGNOSIS — Z17.0 MALIGNANT NEOPLASM OF OVERLAPPING SITES OF RIGHT BREAST IN FEMALE, ESTROGEN RECEPTOR POSITIVE: Primary | ICD-10-CM

## 2021-10-07 DIAGNOSIS — Z80.41 FAMILY HISTORY OF OVARIAN CANCER: ICD-10-CM

## 2021-10-07 PROCEDURE — 99214 PR OFFICE/OUTPT VISIT, EST, LEVL IV, 30-39 MIN: ICD-10-PCS | Mod: S$GLB,,, | Performed by: INTERNAL MEDICINE

## 2021-10-07 PROCEDURE — 99214 OFFICE O/P EST MOD 30 MIN: CPT | Mod: S$GLB,,, | Performed by: INTERNAL MEDICINE

## 2021-11-16 ENCOUNTER — TELEPHONE (OUTPATIENT)
Dept: HEMATOLOGY/ONCOLOGY | Facility: CLINIC | Age: 71
End: 2021-11-16
Payer: MEDICARE

## 2021-12-08 ENCOUNTER — OFFICE VISIT (OUTPATIENT)
Dept: HEMATOLOGY/ONCOLOGY | Facility: CLINIC | Age: 71
End: 2021-12-08
Payer: MEDICARE

## 2021-12-08 VITALS
HEART RATE: 68 BPM | SYSTOLIC BLOOD PRESSURE: 122 MMHG | BODY MASS INDEX: 43.06 KG/M2 | WEIGHT: 234 LBS | RESPIRATION RATE: 20 BRPM | HEIGHT: 62 IN | DIASTOLIC BLOOD PRESSURE: 81 MMHG

## 2021-12-08 DIAGNOSIS — C50.811 MALIGNANT NEOPLASM OF OVERLAPPING SITES OF RIGHT BREAST IN FEMALE, ESTROGEN RECEPTOR POSITIVE: Primary | ICD-10-CM

## 2021-12-08 DIAGNOSIS — Z17.0 MALIGNANT NEOPLASM OF OVERLAPPING SITES OF RIGHT BREAST IN FEMALE, ESTROGEN RECEPTOR POSITIVE: Primary | ICD-10-CM

## 2021-12-08 PROCEDURE — 99214 OFFICE O/P EST MOD 30 MIN: CPT | Mod: S$GLB,,, | Performed by: INTERNAL MEDICINE

## 2021-12-08 PROCEDURE — 99214 PR OFFICE/OUTPT VISIT, EST, LEVL IV, 30-39 MIN: ICD-10-PCS | Mod: S$GLB,,, | Performed by: INTERNAL MEDICINE

## 2021-12-08 RX ORDER — APIXABAN 5 MG/1
TABLET, FILM COATED ORAL
COMMUNITY
Start: 2021-10-11 | End: 2022-11-28

## 2021-12-11 ENCOUNTER — TELEPHONE (OUTPATIENT)
Dept: HEMATOLOGY/ONCOLOGY | Facility: HOSPITAL | Age: 71
End: 2021-12-11
Payer: MEDICARE

## 2022-01-06 ENCOUNTER — OFFICE VISIT (OUTPATIENT)
Dept: RADIATION ONCOLOGY | Facility: CLINIC | Age: 72
End: 2022-01-06
Payer: MEDICARE

## 2022-01-06 ENCOUNTER — SOCIAL WORK (OUTPATIENT)
Dept: HEMATOLOGY/ONCOLOGY | Facility: CLINIC | Age: 72
End: 2022-01-06

## 2022-01-06 ENCOUNTER — OFFICE VISIT (OUTPATIENT)
Dept: HEMATOLOGY/ONCOLOGY | Facility: CLINIC | Age: 72
End: 2022-01-06
Payer: MEDICARE

## 2022-01-06 VITALS
WEIGHT: 233 LBS | SYSTOLIC BLOOD PRESSURE: 112 MMHG | DIASTOLIC BLOOD PRESSURE: 66 MMHG | RESPIRATION RATE: 16 BRPM | TEMPERATURE: 98 F | HEART RATE: 71 BPM | BODY MASS INDEX: 42.62 KG/M2 | OXYGEN SATURATION: 97 %

## 2022-01-06 VITALS
SYSTOLIC BLOOD PRESSURE: 126 MMHG | RESPIRATION RATE: 18 BRPM | DIASTOLIC BLOOD PRESSURE: 78 MMHG | WEIGHT: 233 LBS | BODY MASS INDEX: 42.88 KG/M2 | HEART RATE: 69 BPM | HEIGHT: 62 IN

## 2022-01-06 DIAGNOSIS — C50.811 MALIGNANT NEOPLASM OF OVERLAPPING SITES OF RIGHT BREAST IN FEMALE, ESTROGEN RECEPTOR POSITIVE: ICD-10-CM

## 2022-01-06 DIAGNOSIS — K86.89 MASS OF PANCREAS: Primary | ICD-10-CM

## 2022-01-06 DIAGNOSIS — Z17.0 MALIGNANT NEOPLASM OF OVERLAPPING SITES OF RIGHT BREAST IN FEMALE, ESTROGEN RECEPTOR POSITIVE: ICD-10-CM

## 2022-01-06 DIAGNOSIS — Z90.5 SINGLE FUNCTIONAL KIDNEY: ICD-10-CM

## 2022-01-06 PROCEDURE — 99205 PR OFFICE/OUTPT VISIT, NEW, LEVL V, 60-74 MIN: ICD-10-PCS | Mod: S$GLB,,, | Performed by: RADIOLOGY

## 2022-01-06 PROCEDURE — 99205 OFFICE O/P NEW HI 60 MIN: CPT | Mod: S$GLB,,, | Performed by: RADIOLOGY

## 2022-01-06 PROCEDURE — 99213 PR OFFICE/OUTPT VISIT, EST, LEVL III, 20-29 MIN: ICD-10-PCS | Mod: S$GLB,,, | Performed by: INTERNAL MEDICINE

## 2022-01-06 PROCEDURE — 99213 OFFICE O/P EST LOW 20 MIN: CPT | Mod: S$GLB,,, | Performed by: INTERNAL MEDICINE

## 2022-01-06 NOTE — PROGRESS NOTES
Leyda Bravo  [id number]  1950  JUDSON Jacob Md  [address]  [address]  [place] [address]    Dx: Stage IA  [pT2N0(sn)M0 - G2 - ER/FL(+)]  ILC of the R breast  +  incidental pancreas nodule    HISTORY OF PRESENT ILLNESS:   Ms. Bravo is a 71F w/ h/o DVT who was noted on w/u for a PE recently to have a left breast mass and a pancreatic head mass, along w/ some nonsuspicous lung nodules.  Thus she underwent w/u for the breast via MMG which showed the left breast lesion to be benign, however a right breast mass was noted and found to be ILC on CNBx.  She then underwent BCS+SLNBx, however (+)SM were noted, so she then underwent R-TM, ultimately provided clear margins and her path px stage as above.  She has been referred to [facility] for eval/discussion re: PMRT.    REVIEW OF SYSTEMS:  A complete ROS was performed and the patient denies any acute changes/concerns other than per HPI.    Past Medical History:   Diagnosis Date    Hypertension     Obese     Thyroid disease     hypothyroidism     Past Surgical History:   Procedure Laterality Date     SECTION      CHOLECYSTECTOMY      COLON SURGERY      colostomy    HERNIA REPAIR      KIDNEY SURGERY Left 2006    removal     Social History     Socioeconomic History    Marital status:    Tobacco Use    Smoking status: Never Smoker    Smokeless tobacco: Never Used   Substance and Sexual Activity    Alcohol use: No    Drug use: No     No family history on file.    PRIOR HISTORY OF CHEMOTHERAPY OR RADIOTHERAPY: Please see HPI for patients prior oncologic history.    Medication List with Changes/Refills   Current Medications    ALBUTEROL-IPRATROPIUM 2.5MG-0.5MG/3ML (DUO-NEB) 0.5 MG-3 MG(2.5 MG BASE)/3 ML NEBULIZER SOLUTION    Take 3 mLs by nebulization every 6 (six) hours while awake.    ALUM-MAG HYDROXIDE-SIMETH 225-200-25 MG/5 ML SUSP    Take 30 mLs by mouth 4 (four) times daily as needed (heartburn).    APIXABAN (ELIQUIS DVT-PE  TREAT 30D START) 5 MG (74 TABS) DSPK    For the first 7 days take two 5 mg tablets twice daily.  After 7 days take one 5 mg tablet twice daily.    ATENOLOL (TENORMIN) 25 MG TABLET    Take 1 tablet (25 mg total) by mouth once daily.    CLOTRIMAZOLE (LOTRIMIN) 1 % CREAM    Apply topically 2 (two) times daily.    ELIQUIS 5 MG TAB        ENOXAPARIN (LOVENOX) 120 MG/0.8 ML SYRG    Inject 0.8 mLs (120 mg total) into the skin every 12 (twelve) hours.    HYDROCODONE-ACETAMINOPHEN 7.5-325MG (NORCO) 7.5-325 MG PER TABLET    Take 1 tablet by mouth every 4 (four) hours as needed.    LEVOTHYROXINE (SYNTHROID) 100 MCG TABLET    Take 1 tablet (100 mcg total) by mouth once daily.    LEVOTHYROXINE (SYNTHROID) 112 MCG TABLET    Take 112 mcg by mouth before breakfast.    METFORMIN (GLUCOPHAGE-XR) 500 MG 24 HR TABLET    Take 1 tablet (500 mg total) by mouth daily with breakfast.    NAPROXEN SODIUM (ALEVE ORAL)    Take by mouth.    PANTOPRAZOLE (PROTONIX) 40 MG TABLET    Take 1 tablet (40 mg total) by mouth once daily.     Review of patient's allergies indicates:  No Known Allergies    QUALITY OF LIFE: 90%- Able to Carry on Normal Activity: Minor Symptoms of Disease    Vitals:    01/06/22 1117   BP: 112/66   Pulse: 71   Resp: 16   Temp: 98.4 °F (36.9 °C)   SpO2: 97%   Weight: 105.7 kg (233 lb)   PainSc: 0-No pain     Body mass index is 42.62 kg/m².    PHYSICAL EXAM:  GENERAL: alert; in no apparent distress.   HEAD: normocephalic, atraumatic.  EYES: pupils are equal, round, reactive to light and accommodation. Sclera anicteric. Conjunctiva not injected.   NOSE/THROAT: no nasal erythema or rhinorrhea. Oropharynx pink, without erythema, ulcerations or thrush.   NECK: no cervical motion rigidity; supple with no masses.  CHEST: clear to auscultation bilaterally; no wheezes, crackles or rubs. Patient is speaking comfortably on room air with normal work of breathing without using accessory muscles of respiration.  CARDIOVASCULAR: regular rate  and rhythm; no murmurs, rubs or gallops.  ABDOMEN: soft, nontender, nondistended. Bowel sounds present.   MUSCULOSKELETAL: no tenderness to palpation along the spine or scapulae. Normal range of motion.  NEUROLOGIC: cranial nerves II-XII intact bilaterally. Strength 5/5 in bilateral upper and lower extremities. No sensory deficits appreciated. Reflexes globally intact. No cerebellar signs. Normal gait.  LYMPHATIC: no cervical, supraclavicular or axillary adenopathy appreciated bilaterally.   EXTREMITIES: no clubbing, cyanosis, edema.  SKIN: no erythema, rashes, ulcerations noted.   BREAST:  The right CW and axillary incisions appeared well healed w/o inflammation or significant seroma. Palpation revealed soft, pliable tissue of bilateral chest/breast without any discrete lesions or masses. The right and left axillae did not exhibit any evidence of lymphadenopathy. No nipple retraction/inversion or discharge appreciated.    REVIEW OF IMAGING/PATHOLOGY/LABS: Please see HPI. All images reviewed personally by dictating physician.       ASSESSMENT: Leyda Bravo is a 71 y.o. female with h/o stage IA  [pT2N0(sn)M0 - G2 - ER/TN(+)]  ILC of the R breast  +  incidental pancreas nodule    PLAN:  After complete review of the patient's chart/hx and eval/discussion w/ the patient today, I explained that she does not require PMRT for her stage/findings of breast cancer.  I reviewed the indications and she verbalized understanding.    She is meeting w/ Dr. Jacob today re: adj endocrine therapy as well.  It was explained to her that he will require at least biannual clinical breast/chest exams as well as annual left breast mammograms going forward.    I then discussed the incidentally noted pancreatic head mass and recommended that repeat imaging via CT be performed, and this has been ordered by Dr. Jacob who will continue to f/u with her in [place].    The patient verbalized understanding of the above plan, risks, benefits, and  details. Contact info was exchanged, and the patient was encouraged to contact our clinic with any questions, needs, or concerns.    DISPOSITION: RTC PRN    I have personally seen and evaluated this patient. Greater than 50% of this time was spent discussing coordination of care and/or counseling.    PHYSICIAN: Oneal Raphael III, MD    Thank you for the opportunity to meet and consult with Leyda Bravo.   Please feel free to contact me to discuss the above recommendation further.

## 2022-01-08 NOTE — PROGRESS NOTES
[facility] in office hematology Oncology Subsequent  encounter note  22    Subjective:      Patient ID:   Leyda Bravo  72 y.o. female  1950  HEIDI Brink MD      Chief Complaint:   DVT    HPI:  72 y.o. female  had a near syncopal episode, 2 or 3 weeks later she was found to have extensive right and left leg DVT on May 17, 2021.   She also had shortness of breath symptoms, however I have not documented the finding of a pulmonary embolus.  She was treated with heparin intravenously and transitioned over to Eliquis 5 mg 1 p.o. b.i.d. at this time.  Leg symptoms are much improved.  Will plan to do U/S of legs and CTA of chest 2021.    She complains of low back pain chronically and x-ray study showed that she had some degenerative joint disease involving her lower lumbar vertebrae probably accounting for her symptoms.    Other findings included a 1.7 cm mass in the left breast and an 11 mm nodule in the pancreas and a small lung  Nodule of 3 mm in size.    Mammogram and U/S support that L breast nodule is benign, but nodule found at areola of R breast.  Bx of R breast mass  Showed Invasive lobular Carcinoma, LCIS, tumor 1.1 cm, grade 1, ERP+, PRP+,   H2N neg.    Discussed R lumpectomy, Sentinal node Bx and adjuvant Rad Rx.  Vs R mastectomy, Sentinal Node Bx.  Refer to Dr. Zac Odom of surgery.  For surgery 10/12/21.  BrCa1 & 2 Negative.  21.  She saw  Dr. Raphael no Rad Rx recommended.    Will plan Oncotype Dx testing regards adjuvant hormonal/chemotherapy.      Has history of colitis and had total colectomy in , her course was complicated by a pulmonary embolus.  She had hernia repair in  and had complications with wound dehiscence and her course was complicated by a pulmonary embolus.  Other history includes Caesarean section x1, cholecystectomy, left nephrectomy for stones in , hernia repair x2.  She is a  3 para 3 miscarriage 0 individual    She she has  history of hypertension, heart disease, arthritis in the knees hips and ankles.  Skin cancer has removed from the face in the past and from the shoulder area.  She has hypothyroidism.  Of    She smoked from age [age] for approximately 20 years, smoking 1 pack per day.  She does not drink alcohol with regularity.  She denies allergies to medications.  She worked as a garment .    Two of her sisters have had miscarriages, there is no family history of PE or DVT.  Family history does include heart disease, colitis, diabetes, renal disease.  She has a daughter who had ovarian cancer, a daughter who had anemia and her mom  had heart disease.    Her daughter with her today, reports she has a L breast lump.  She has a palpable mass at 12 o'clock of L breast.   Mamm and U/S.  Daughter Sam Tinajero 81 had breast bx, path pending.      ROS:   GEN: normal without any fever, night sweats or weight loss  HEENT: normal with no HA's, sore throat, stiff neck, changes in vision  CV: normal with no CP, SOB, PND, GROVE or orthopnea  PULM: normal with no SOB, cough, hemoptysis, sputum or pleuritic pain  GI: normal with no abdominal pain, nausea, vomiting, constipation, diarrhea, melanotic stools, BRBPR, or hematemesis  : normal with no hematuria, dysuria  BREAST: normal with no mass, discharge, pain  SKIN: normal with no rash, erythema, bruising, or swelling     Past Medical History:   Diagnosis Date    Hypertension     Obese     Thyroid disease     hypothyroidism     Past Surgical History:   Procedure Laterality Date     SECTION      CHOLECYSTECTOMY      COLON SURGERY      colostomy    HERNIA REPAIR      KIDNEY SURGERY Left 2006    removal       Review of patient's allergies indicates:  No Known Allergies  Social History     Socioeconomic History    Marital status:    Tobacco Use    Smoking status: Never Smoker    Smokeless tobacco: Never Used   Substance and Sexual Activity    Alcohol  "use: No    Drug use: No         Current Outpatient Medications:     albuterol-ipratropium 2.5mg-0.5mg/3mL (DUO-NEB) 0.5 mg-3 mg(2.5 mg base)/3 mL nebulizer solution, Take 3 mLs by nebulization every 6 (six) hours while awake., Disp: 25 vial, Rfl: 0    alum-mag hydroxide-simeth 225-200-25 mg/5 mL Susp, Take 30 mLs by mouth 4 (four) times daily as needed (heartburn)., Disp: , Rfl: 0    apixaban (ELIQUIS DVT-PE TREAT 30D START) 5 mg (74 tabs) DsPk, For the first 7 days take two 5 mg tablets twice daily.  After 7 days take one 5 mg tablet twice daily., Disp: 74 tablet, Rfl: 2    atenolol (TENORMIN) 25 MG tablet, Take 1 tablet (25 mg total) by mouth once daily., Disp: 30 tablet, Rfl: 11    clotrimazole (LOTRIMIN) 1 % cream, Apply topically 2 (two) times daily. (Patient not taking: Reported on 1/6/2022), Disp: , Rfl: 0    ELIQUIS 5 mg Tab, , Disp: , Rfl:     enoxaparin (LOVENOX) 120 mg/0.8 mL Syrg, Inject 0.8 mLs (120 mg total) into the skin every 12 (twelve) hours., Disp: , Rfl:     hydrocodone-acetaminophen 7.5-325mg (NORCO) 7.5-325 mg per tablet, Take 1 tablet by mouth every 4 (four) hours as needed., Disp: 30 tablet, Rfl: 0    levothyroxine (SYNTHROID) 100 MCG tablet, Take 1 tablet (100 mcg total) by mouth once daily., Disp: , Rfl:     levothyroxine (SYNTHROID) 112 MCG tablet, Take 112 mcg by mouth before breakfast., Disp: , Rfl:     metformin (GLUCOPHAGE-XR) 500 MG 24 hr tablet, Take 1 tablet (500 mg total) by mouth daily with breakfast., Disp: 90 tablet, Rfl: 3    NAPROXEN SODIUM (ALEVE ORAL), Take by mouth., Disp: , Rfl:     pantoprazole (PROTONIX) 40 MG tablet, Take 1 tablet (40 mg total) by mouth once daily., Disp: 30 tablet, Rfl: 0          Objective:   Vitals:  Blood pressure 126/78, pulse 69, resp. rate 18, height 5' 2" (1.575 m), weight 105.7 kg (233 lb).    Physical Examination:   GEN: no apparent distress, comfortable  HEAD: atraumatic and normocephalic  EYES: no pallor, no icterus  ENT: no " pharyngeal erythema, external ears WNL; no nasal discharge  NECK: no masses, thyroid normal, trachea midline, no LAD/LN's, supple  CV: RRR with no murmur; normal pulse; normal S1 and S2; no pedal edema  CHEST: Normal respiratory effort; CTAB; normal breath sounds; no wheeze or crackles  ABDOM: nontender and nondistended; soft;  no rebound/guarding, L/S NP  MUSC/Skeletal: ROM normal; no crepitus; joints normal;   EXTREM:  She has extensive spider veins at the lower extremities, calves are nontender, without palpable venous cord, she does have 1+ edema bilaterally.  SKIN: no rashes, lesions, ulcers, petechiae   : no cvat  NEURO: grossly intact; motor/sensory WNL;  no tremors  PSYCH: normal mood, affect and behavior  LYMPH: normal cervical, supraclavicular, axillary and groin LN's  BREASTS:  Left and right breast are nontender, without discharge, and without palpable mass.    Labs:   Lab Results   Component Value Date    WBC 7.64 06/15/2021    HGB 11.4 (L) 06/15/2021    HCT 36.3 (L) 06/15/2021    MCV 96 06/15/2021     06/15/2021    CMP  Sodium   Date Value Ref Range Status   06/14/2021 137 136 - 145 mmol/L Final     Potassium   Date Value Ref Range Status   06/14/2021 4.6 3.5 - 5.1 mmol/L Final     Chloride   Date Value Ref Range Status   06/14/2021 102 95 - 110 mmol/L Final     CO2   Date Value Ref Range Status   06/14/2021 23 23 - 29 mmol/L Final     Glucose   Date Value Ref Range Status   06/14/2021 124 (H) 70 - 110 mg/dL Final     BUN   Date Value Ref Range Status   06/14/2021 16 8 - 23 mg/dL Final     Creatinine   Date Value Ref Range Status   06/14/2021 0.9 0.5 - 1.4 mg/dL Final     Calcium   Date Value Ref Range Status   06/14/2021 9.4 8.7 - 10.5 mg/dL Final     Total Protein   Date Value Ref Range Status   06/14/2021 7.1 6.0 - 8.4 g/dL Final     Albumin   Date Value Ref Range Status   06/14/2021 3.3 (L) 3.5 - 5.2 g/dL Final     Total Bilirubin   Date Value Ref Range Status   06/14/2021 1.1 (H) 0.1 -  1.0 mg/dL Final     Comment:     For infants and newborns, interpretation of results should be based  on gestational age, weight and in agreement with clinical  observations.    Premature Infant recommended reference ranges:  Up to 24 hours.............<8.0 mg/dL  Up to 48 hours............<12.0 mg/dL  3-5 days..................<15.0 mg/dL  6-29 days.................<15.0 mg/dL       Alkaline Phosphatase   Date Value Ref Range Status   06/14/2021 65 55 - 135 U/L Final     AST   Date Value Ref Range Status   06/14/2021 31 10 - 40 U/L Final     ALT   Date Value Ref Range Status   06/14/2021 35 10 - 44 U/L Final     Anion Gap   Date Value Ref Range Status   06/14/2021 12 8 - 16 mmol/L Final     eGFR if    Date Value Ref Range Status   06/14/2021 >60.0 >60 mL/min/1.73 m^2 Final     eGFR if non    Date Value Ref Range Status   06/14/2021 >60.0 >60 mL/min/1.73 m^2 Final     Comment:     Calculation used to obtain the estimated glomerular filtration  rate (eGFR) is the CKD-EPI equation.      Recent lab from June 14, 2021 shows a white blood count of 9900, hemoglobin 12.2, hematocrit 37.5, MCV 97, platelet count 173,000. Differential is normal.  Creatinine is 0.9, albumin is 3.3, LFTs are normal.      She is hypercoagulation evaluation and 2017 showed that she was homozygous positive for MTHFR mutation and her homocystine level was increased at 22.  At that time she was started on Metanx daily.     A CTA runoff study was done June 14, 2021 at [facility].  This study showed vascular disease involving the aorta and pelvic and lower extremity arteries., fatty liver was seen, vena cava filter was in place, prominence of pelvic veins was noted.  11 mm low-density mass in the body of the pancreas was seen.  Large right abdominal hernia was noted without obstruction.      Previous CT scan of lumbar spine show mild multilevel degeneration with facet arthropathy.  CT scan of the chest from June  8, 2021 showed bronchitis without pulmonary embolus being seen.  A 17 mm nodule was noted in the left breast.    Lower extremity venous study showed acute occlusive deep vein thrombosis bilaterally with clot in the common and superficial femoral veins, popliteal vein, extending into the calves bilaterally.  Right greater saphenous vein was occluded.      Partial Mastectomy 2.3 cm lobular invasive Ca, grade 2 dx, 1/1 sentinal LN negative, possitive margin.    Mastectomy, no residual dx.    ERP+, PRP+, H2N negative.    Stage 2 Dx by primary size.    Submit for Oncotype Dx testing regards adjuvant hormonal and chemotherapy.      Assessment:   (1) 72 y.o. female with diagnosis of extensive bilateral DVT, timely related to a previous fall.  Presently treated with Eliquis 5 mg p.o. b.i.d., clinically improving with decrease symptoms and decreased physical findings at the left and right leg.  History of pulmonary emboli in the past following surgical procedures.    (2) previous hypercoagulation evaluation showed she was double homozygous positive with MTHFR mutation and homocystine level was increased at 22. I started her on Metanx at that time, and last saw her in follow-up in 2017.     She is on Eliquis b.i.d. at this time.  I plan to repeat her venous Doppler studies of the legs mid 9/2021  to confirm that the clot has resolved. And check CTA of chest now.   Because of her history of recurrence clots, I would plan to maintain her on Eliquis chronically as prophylaxis against clot events.    (3)R breast Invasive Lobular Ca, LCIS.  Daughter with L breast mass.  Daughter with ovarian cancer hx.  BRCA 1 & 2, breast cancer gene, HH.  Negative.    She has pancreas mass, on previous study.   Recheck CAT of abdomin now.  Oncotype Dx pending.  RTC 1 month.

## 2022-01-11 NOTE — PROGRESS NOTES
Ms. Leyda Bravo is a 72 year old here for a radiation consult with Dr. Willson.  I met with patient to complete new patient orientation and to complete the NCCN Distress Screening; she indicated a rating of 3.  Patient denied needing psychosocial support at this time.  [facility] Financial Assistance reviewed with patient.  I provided patient with my contact information in the event she needs supportive services in the future.

## 2022-10-15 ENCOUNTER — HOSPITAL ENCOUNTER (EMERGENCY)
Facility: HOSPITAL | Age: 72
Discharge: HOME | End: 2022-10-15
Attending: EMERGENCY MEDICINE
Payer: MEDICARE

## 2022-10-15 VITALS
RESPIRATION RATE: 18 BRPM | HEIGHT: 62 IN | WEIGHT: 214 LBS | OXYGEN SATURATION: 96 % | DIASTOLIC BLOOD PRESSURE: 88 MMHG | SYSTOLIC BLOOD PRESSURE: 162 MMHG | TEMPERATURE: 98 F | HEART RATE: 66 BPM | BODY MASS INDEX: 39.38 KG/M2

## 2022-10-15 DIAGNOSIS — I82.90 ACUTE DEEP VEIN THROMBOSIS (DVT) OF NON-EXTREMITY VEIN: ICD-10-CM

## 2022-10-15 DIAGNOSIS — R22.1 NECK MASS: Primary | ICD-10-CM
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
## 2022-10-15 LAB
ALBUMIN SERPL BCP-MCNC: 3.6 G/DL (ref 3.5–5.2)
ALP SERPL-CCNC: 86 U/L (ref 55–135)
ALT SERPL W/O P-5'-P-CCNC: 15 U/L (ref 10–44)
ANION GAP SERPL CALC-SCNC: 11 MMOL/L (ref 8–16)
AST SERPL-CCNC: 19 U/L (ref 10–40)
BASOPHILS # BLD AUTO: 0.02 K/UL (ref 0–0.2)
BASOPHILS NFR BLD: 0.2 % (ref 0–1.9)
BILIRUB SERPL-MCNC: 1 MG/DL (ref 0.1–1)
BUN SERPL-MCNC: 11 MG/DL (ref 8–23)
CALCIUM SERPL-MCNC: 9.8 MG/DL (ref 8.7–10.5)
CHLORIDE SERPL-SCNC: 101 MMOL/L (ref 95–110)
CO2 SERPL-SCNC: 26 MMOL/L (ref 23–29)
CREAT SERPL-MCNC: 0.9 MG/DL (ref 0.5–1.4)
CREAT SERPL-MCNC: 0.9 MG/DL (ref 0.5–1.4)
DIFFERENTIAL METHOD: ABNORMAL
EOSINOPHIL # BLD AUTO: 0.1 K/UL (ref 0–0.5)
EOSINOPHIL NFR BLD: 0.8 % (ref 0–8)
ERYTHROCYTE [DISTWIDTH] IN BLOOD BY AUTOMATED COUNT: 12.6 % (ref 11.5–14.5)
EST. GFR  (NO RACE VARIABLE): >60 ML/MIN/1.73 M^2
GLUCOSE SERPL-MCNC: 111 MG/DL (ref 70–110)
HCT VFR BLD AUTO: 45.3 % (ref 37–48.5)
HGB BLD-MCNC: 14.6 G/DL (ref 12–16)
IMM GRANULOCYTES # BLD AUTO: 0.03 K/UL (ref 0–0.04)
IMM GRANULOCYTES NFR BLD AUTO: 0.3 % (ref 0–0.5)
LYMPHOCYTES # BLD AUTO: 1.6 K/UL (ref 1–4.8)
LYMPHOCYTES NFR BLD: 18.2 % (ref 18–48)
MCH RBC QN AUTO: 30.2 PG (ref 27–31)
MCHC RBC AUTO-ENTMCNC: 32.2 G/DL (ref 32–36)
MCV RBC AUTO: 94 FL (ref 82–98)
MONOCYTES # BLD AUTO: 0.7 K/UL (ref 0.3–1)
MONOCYTES NFR BLD: 7.4 % (ref 4–15)
NEUTROPHILS # BLD AUTO: 6.4 K/UL (ref 1.8–7.7)
NEUTROPHILS NFR BLD: 73.1 % (ref 38–73)
NRBC BLD-RTO: 0 /100 WBC
PLATELET # BLD AUTO: 220 K/UL (ref 150–450)
PMV BLD AUTO: 9.2 FL (ref 9.2–12.9)
POTASSIUM SERPL-SCNC: 4.5 MMOL/L (ref 3.5–5.1)
PROT SERPL-MCNC: 7.4 G/DL (ref 6–8.4)
RBC # BLD AUTO: 4.83 M/UL (ref 4–5.4)
SAMPLE: NORMAL
SODIUM SERPL-SCNC: 138 MMOL/L (ref 136–145)
WBC # BLD AUTO: 8.79 K/UL (ref 3.9–12.7)

## 2022-10-15 PROCEDURE — 25500020 PHARM REV CODE 255: Performed by: EMERGENCY MEDICINE

## 2022-10-15 PROCEDURE — 96374 THER/PROPH/DIAG INJ IV PUSH: CPT

## 2022-10-15 PROCEDURE — 63600175 PHARM REV CODE 636 W HCPCS: Performed by: EMERGENCY MEDICINE

## 2022-10-15 PROCEDURE — 99285 EMERGENCY DEPT VISIT HI MDM: CPT | Mod: 25

## 2022-10-15 PROCEDURE — 85025 COMPLETE CBC W/AUTO DIFF WBC: CPT | Performed by: EMERGENCY MEDICINE

## 2022-10-15 PROCEDURE — 80053 COMPREHEN METABOLIC PANEL: CPT | Performed by: EMERGENCY MEDICINE

## 2022-10-15 RX ADMIN — IOHEXOL 100 ML: 350 INJECTION, SOLUTION INTRAVENOUS at 07:10

## 2022-10-15 RX ADMIN — METHYLPREDNISOLONE SODIUM SUCCINATE 80 MG: 40 INJECTION, POWDER, FOR SOLUTION INTRAMUSCULAR; INTRAVENOUS at 07:10

## 2022-10-16 NOTE — ED PROVIDER NOTES
Encounter Date: 10/15/2022       History     Chief Complaint   Patient presents with    Hip Pain     Fell last Saturday on right hip - tried muscle relaxers and tylenol with no relief - able to bear weight for a short time on right leg but pain has increased     Patient presents complaining of left-sided neck mass that is been ongoing for the last few weeks and bilateral hip pain.  Patient states she has seen the doctor about the neck mass but it is getting worse.  She has had no previous imaging.  She complains of bilateral hips hurting and difficulty walking.  She denies any difficulty breathing or swallowing.  At the worst symptoms are moderate.  Nothing makes it better worse.    Review of patient's allergies indicates:  No Known Allergies  Past Medical History:   Diagnosis Date    Hypertension     Obese     Thyroid disease     hypothyroidism     Past Surgical History:   Procedure Laterality Date     SECTION      CHOLECYSTECTOMY      COLON SURGERY      colostomy    HERNIA REPAIR      KIDNEY SURGERY Left 2006    removal     No family history on file.  Social History     Tobacco Use    Smoking status: Never    Smokeless tobacco: Never   Substance Use Topics    Alcohol use: No    Drug use: No     Review of Systems   All other systems reviewed and are negative.    Physical Exam     Initial Vitals [10/15/22 1702]   BP Pulse Resp Temp SpO2   127/72 62 18 98.4 °F (36.9 °C) 98 %      MAP       --         Physical Exam    Nursing note and vitals reviewed.  Constitutional: She appears well-developed and well-nourished.   Pleasant, polite   HENT:   Head: Normocephalic and atraumatic.   Eyes: EOM are normal.   Neck:   There is fullness to the left anterior neck with no erythema warmth or cellulitis.  There is edema noted that is mild.  The trachea is midline.  There is no elevation the floor the mouth.   Cardiovascular:  Intact distal pulses.           Pulmonary/Chest: Breath sounds normal. No respiratory distress.    Musculoskeletal:         General: Normal range of motion.      Comments: There is full range of motion of both hips although it does cause pain.  There is no erythema warmth or cellulitis.     Neurological: She is alert and oriented to person, place, and time. She has normal strength.   Skin: Skin is warm and dry. Capillary refill takes less than 2 seconds.   Psychiatric: She has a normal mood and affect. Her behavior is normal. Judgment and thought content normal.       ED Course   Procedures  Labs Reviewed   CBC W/ AUTO DIFFERENTIAL - Abnormal; Notable for the following components:       Result Value    Gran % 73.1 (*)     All other components within normal limits   COMPREHENSIVE METABOLIC PANEL - Abnormal; Notable for the following components:    Glucose 111 (*)     All other components within normal limits   ISTAT CREATININE   POCT CREATININE          Imaging Results              CT Soft Tissue Neck With Contrast (Edited Result - FINAL)  Result time 10/15/22 20:36:30      Edited Result - FINAL by Mervin Hilliard MD (10/15/22 20:36:30)                   Narrative:         ADDENDUM #1       Findings discussed with Dr. Shaggy Matthew on date of exam at 8:26 PM    Electronically signed by:  Mervin Hilliard MD  10/15/2022 8:36 PM CDT Workstation: [id number]     ORIGINAL REPORT       CT neck with contrast    Comparison:  None    Additional pertinent history:  Neck mass    TECHNIQUE:  Multiple axial images were obtained from the mid portion of the brain through the superior mediastinum with  IV contrast.  Coronal and sagittal reformatted images were obtained off the axial source data.  One of the following dose optimization techniques was utilized in the performance of this exam: Automated exposure control; adjustment of the mA and/or kV according to the patient's size; or use of an iterative  reconstruction technique.  Specific details can be referenced in the facility's radiology CT exam operational policy.    CONTRAST:   100 mL of Omnipaque 350    FINDINGS:    Visualized portions of the brain parenchyma:Negative    Parotid glands/submandibular glands/thyroid:  Negative    Orbits:  Negative    Paranasal sinuses:  Negative    Parapharyngeal spaces: Negative    Nasopharynx/oropharynx/hypopharynx:  Negative    Tonsillar pillars:  Negative    Oral tongue/tongue base:  Negative    True and false cords:  Negative    Lymph node assessment: Malignant appearing necrotic lymph node within the left level two lymph node measuring 1.5 cm. Large partially necrotic sae mass within the left level four lymph nodes measuring 3.3 x 3.7 cm.    Surrounding soft tissues:  Negative    Vasculature:  There are findings of lack of flow within the left internal jugular vein from the skull base through the level of the sae mass within the left mid neck compatible with thrombosis of the left internal jugular vein. Given that the left internal jugular vein extends into this mass this could represent tumor thrombus as one possibility.    Osseous structures:  Mild spondylitic change involving the cervical spine.    Lung apices:  Negative    IMPRESSION:  1. Necrotic, malignant-appearing lymphadenopathy within the left mid neck. Although no lesion is noted within the thyroid given the appearance of the lymph nodes this is concerning for potential occult malignancy of the thyroid with metastatic disease within the neck. Other possibilities would include an occult head and neck carcinoma.  2. Findings of thrombosis of the left internal jugular vein. Given that the internal jugular vein extends into this sae mass midportion of this thrombus could represent tumor thrombus as well.    Electronically signed by:  Mervin Hilliard MD  10/15/2022 8:18 PM CDT Workstation: [id number]                      Final result by Mervin Hilliard MD (10/15/22 20:18:26)                   Narrative:    CT neck with contrast    Comparison:  None    Additional pertinent history:  Neck  mass    TECHNIQUE:  Multiple axial images were obtained from the mid portion of the brain through the superior mediastinum with  IV contrast.  Coronal and sagittal reformatted images were obtained off the axial source data.  One of the following dose optimization techniques was utilized in the performance of this exam: Automated exposure control; adjustment of the mA and/or kV according to the patient's size; or use of an iterative  reconstruction technique.  Specific details can be referenced in the facility's radiology CT exam operational policy.    CONTRAST:  100 mL of Omnipaque 350    FINDINGS:    Visualized portions of the brain parenchyma:Negative    Parotid glands/submandibular glands/thyroid:  Negative    Orbits:  Negative    Paranasal sinuses:  Negative    Parapharyngeal spaces: Negative    Nasopharynx/oropharynx/hypopharynx:  Negative    Tonsillar pillars:  Negative    Oral tongue/tongue base:  Negative    True and false cords:  Negative    Lymph node assessment: Malignant appearing necrotic lymph node within the left level two lymph node measuring 1.5 cm. Large partially necrotic sae mass within the left level four lymph nodes measuring 3.3 x 3.7 cm.    Surrounding soft tissues:  Negative    Vasculature:  There are findings of lack of flow within the left internal jugular vein from the skull base through the level of the sae mass within the left mid neck compatible with thrombosis of the left internal jugular vein. Given that the left internal jugular vein extends into this mass this could represent tumor thrombus as one possibility.    Osseous structures:  Mild spondylitic change involving the cervical spine.    Lung apices:  Negative    IMPRESSION:  1. Necrotic, malignant-appearing lymphadenopathy within the left mid neck. Although no lesion is noted within the thyroid given the appearance of the lymph nodes this is concerning for potential occult malignancy of the thyroid with metastatic disease  within the neck. Other possibilities would include an occult head and neck carcinoma.  2. Findings of thrombosis of the left internal jugular vein. Given that the internal jugular vein extends into this sae mass midportion of this thrombus could represent tumor thrombus as well.    Electronically signed by:  Mervin Hilliard MD  10/15/2022 8:18 PM CDT Workstation: [id number]                                     X-Ray Hip 2 or 3 views Right (with Pelvis when performed) (Final result)  Result time 10/15/22 17:47:00      Final result by Alvarado Chen MD (10/15/22 17:47:00)                   Narrative:    REASON: Pain.    TECHNIQUE: AP pelvic radiograph and two views of the right hips    COMPARISON: None.    FINDINGS:    No acute fracture or dislocation. No destructive osseous lesions. There is moderate to severe bilateral hip joint osteoarthropathy with joint space narrowing, osteophytosis and subchondral sclerosis. Pelvic phleboliths noted. No gross soft tissue abnormality.    IMPRESSION:    Bilateral moderate to severe osteoarthropathy of the hips. No acute abnormality.    Electronically signed by:  Alvarado Chen DO  10/15/2022 5:47 PM CDT Workstation: [id number]                                     Medications   methylPREDNISolone sodium succinate injection 80 mg (80 mg Intravenous Given 10/15/22 1901)   iohexoL (OMNIPAQUE 350) injection 100 mL (100 mLs Intravenous Given 10/15/22 1933)     Medical Decision Making:   Initial Assessment:   No apparent distress  Differential Diagnosis:   Considerations include but are not limited to thyroid malignancy, neck mass, DVT, hip fracture, arthritis  Clinical Tests:   Lab Tests: Reviewed and Ordered  Radiological Study: Ordered and Reviewed  Medical Tests: Reviewed and Ordered  ED Management:  Patient indeed does have findings on neck CT that are concerning for malignancy.  There is also suggestion of DVT in the left IJ and perhaps surrounding tumor burden.  Patient remains  clinically stable.  She is awake and alert.  She is protecting her airway.  Patient will require emergent transfer to ENT for evaluation and treatment secondary to new mass with associated thrombus and tumor burden.  Patient remains clinically stable.    I was able to discussed the case with ENT on-call at [facility] Dr. Rm who recommended no anticoagulation at this time and will see the patient urgently in clinic on Monday.  I am thankful to him for this consult.  Patient be discharged stable condition.  Detailed return precautions discussed.                        Clinical Impression:   Final diagnoses:  [W19.XXXA] Fall  [R22.1] Neck mass (Primary)  [I82.90] Acute deep vein thrombosis (DVT) of non-extremity vein        ED Disposition Condition    Discharge Stable          ED Prescriptions    None       Follow-up Information       Follow up With Specialties Details Why Contact Info    Eyad Rm MD Otolaryngology Schedule an appointment as soon as possible for a visit in 3 days  [address] [facility] [address]  [phone]               Shaggy Matthew MD  10/15/22 2032       Shaggy Matthew MD  10/15/22 2055

## 2022-10-16 NOTE — DISCHARGE INSTRUCTIONS
The CT findings today are highly concerning for malignancy.  Is important that you follow-up with your nose and throat urgently this week.  Ear nose and throat physician Dr. Stover office will call for an appointment this week at [facility]..  Return to the ER if any difficulty breathing or any concern.

## 2022-10-26 ENCOUNTER — HOSPITAL ENCOUNTER (OUTPATIENT)
Dept: RADIOLOGY | Facility: HOSPITAL | Age: 72
Discharge: HOME | End: 2022-10-26
Attending: OTOLARYNGOLOGY
Payer: MEDICARE

## 2022-10-26 ENCOUNTER — OFFICE VISIT (OUTPATIENT)
Dept: OTOLARYNGOLOGY | Facility: CLINIC | Age: 72
End: 2022-10-26
Payer: MEDICARE

## 2022-10-26 VITALS — SYSTOLIC BLOOD PRESSURE: 113 MMHG | HEART RATE: 53 BPM | DIASTOLIC BLOOD PRESSURE: 61 MMHG

## 2022-10-26 DIAGNOSIS — R22.1 NECK MASS: ICD-10-CM

## 2022-10-26 DIAGNOSIS — R22.1 NECK MASS: Primary | ICD-10-CM

## 2022-10-26 PROCEDURE — 99213 OFFICE O/P EST LOW 20 MIN: CPT | Mod: PBBFAC,25 | Performed by: OTOLARYNGOLOGY

## 2022-10-26 PROCEDURE — 76536 US EXAM OF HEAD AND NECK: CPT | Mod: TC

## 2022-10-26 PROCEDURE — 31575 DIAGNOSTIC LARYNGOSCOPY: CPT | Mod: S$PBB,,, | Performed by: OTOLARYNGOLOGY

## 2022-10-26 PROCEDURE — 99204 OFFICE O/P NEW MOD 45 MIN: CPT | Mod: S$PBB,25,, | Performed by: OTOLARYNGOLOGY

## 2022-10-26 PROCEDURE — 99204 PR OFFICE/OUTPT VISIT, NEW, LEVL IV, 45-59 MIN: ICD-10-PCS | Mod: S$PBB,25,, | Performed by: OTOLARYNGOLOGY

## 2022-10-26 PROCEDURE — 99999 PR PBB SHADOW E&M-EST. PATIENT-LVL III: CPT | Mod: PBBFAC,,, | Performed by: OTOLARYNGOLOGY

## 2022-10-26 PROCEDURE — 99999 PR PBB SHADOW E&M-EST. PATIENT-LVL III: ICD-10-PCS | Mod: PBBFAC,,, | Performed by: OTOLARYNGOLOGY

## 2022-10-26 PROCEDURE — 31575 DIAGNOSTIC LARYNGOSCOPY: CPT | Mod: PBBFAC | Performed by: OTOLARYNGOLOGY

## 2022-10-26 PROCEDURE — 31575 PR LARYNGOSCOPY, FLEXIBLE; DIAGNOSTIC: ICD-10-PCS | Mod: S$PBB,,, | Performed by: OTOLARYNGOLOGY

## 2022-10-27 NOTE — ASSESSMENT & PLAN NOTE
Large left neck mass.  Highly concerning for malignancy.  Masses obvious on imaging but not readily palpable on exam.  I ordered an ultrasound of the neck to facilitate ultrasound-guided fine-needle aspiration.  I will contact her with the results and will set up ultrasound-guided fine-needle aspiration to follow this diagnostic study.

## 2022-10-27 NOTE — PROGRESS NOTES
Chief Complaint   Patient presents with    consult/ mass on left side of neck, problems swallowing at        HPI   72 y.o. female presents for evaluation of a left-sided neck mass noted on CT scan.  She reports has been present for several years it waxes and wanes in size.  It is not painful.  She denies sore throat, dysphagia or ananda shortness of breath.  She reports history of skin cancer of the left cheek which, based on her recollection, was consistent with melanoma.    Review of Systems   Constitutional: Negative for fatigue and unexpected weight change.   HENT: Per HPI.  Eyes: Negative for visual disturbance.   Respiratory: Negative for shortness of breath, hemoptysis   Cardiovascular: Negative for chest pain and palpitations.   Musculoskeletal: Negative for decreased ROM, back pain.   Skin: Negative for rash, sunburn, itching.   Neurological: Negative for dizziness and seizures.   Hematological: Negative for adenopathy. Does not bruise/bleed easily.   Endocrine: Negative for rapid weight loss/weight gain, heat/cold intolerance.     Past Medical History   Patient Active Problem List   Diagnosis    History of total colectomy    Essential hypertension    Obesity, Class III, BMI 40-49.9 (morbid obesity)    Hypothyroidism due to acquired atrophy of thyroid    History of nephrectomy, unilateral    Acute pulmonary embolism    DVT (deep venous thrombosis)    Neck mass           Past Surgical History   Past Surgical History:   Procedure Laterality Date     SECTION      CHOLECYSTECTOMY      COLON SURGERY      colostomy    HERNIA REPAIR      KIDNEY SURGERY Left 2006    removal         Family History   History reviewed. No pertinent family history.        Social History   .  Social History     Socioeconomic History    Marital status:    Tobacco Use    Smoking status: Never    Smokeless tobacco: Never   Substance and Sexual Activity    Alcohol use: No    Drug use: No         Allergies   Review of patient's  allergies indicates:  No Known Allergies        Physical Exam     Vitals:    10/26/22 1337   BP: 113/61   Pulse: (!) 53         There is no height or weight on file to calculate BMI.      General: AOx3, NAD   Respiratory:  Symmetric chest rise, normal effort  Nose: No gross nasal septal deviation. Inferior Turbinates WNL bilaterally. No septal perforation. No masses/lesions.   Oral Cavity:  Oral Tongue mobile, no lesions noted. Hard Palate WNL. No buccal or FOM lesions.  Oropharynx:  No masses/lesions of the posterior pharyngeal wall. Tonsillar fossa without lesions. Soft palate without masses. Midline uvula.   Neck: No scars.  No cervical lymphadenopathy, thyromegaly or thyroid nodules.  Fullness of left level 2 and 3.  Normal range of motion.    Face: House Brackmann I bilaterally.       Flex Naso Evy Hypo Procedures #2    Procedure:  Diagnostic flexible nasopharyngoscopy, laryngoscopy and hypopharyngoscopy:    Routine preparation with local atomizer with 1% neosynephrine/pontocaine with customary flexible endoscope.    Nasopharynx:  No lesions.   Mucosa:  No lesions.   Adenoids:  Present.  Posterior Choanae:  Patent.  Eustachian Tubes:  Patent.  Posterior pharynx:  No lesions.  Larynx/hypopharynx:   Epiglottis:  No lesions, without edema.   AE Folds:  No lesions.   Vocal cords:  No polyps, nodules, ulcers or lesions.   Mobility:  Equal and normal bilateral.   Hypopharynx:  No lesions.   Piriform sinus:  No pooling, no lesions.   Post Cricoid:  No erythema, no edema.    CT neck reviewed.    Assessment/Plan  Problem List Items Addressed This Visit          ENT    Neck mass - Primary     Large left neck mass.  Highly concerning for malignancy.  Masses obvious on imaging but not readily palpable on exam.  I ordered an ultrasound of the neck to facilitate ultrasound-guided fine-needle aspiration.  I will contact her with the results and will set up ultrasound-guided fine-needle aspiration to follow this diagnostic  study.         Relevant Orders    US Soft Tissue Head Neck Thyroid (Completed)

## 2022-10-28 DIAGNOSIS — R22.1 NECK MASS: Primary | ICD-10-CM

## 2022-11-03 ENCOUNTER — TELEPHONE (OUTPATIENT)
Dept: INTERVENTIONAL RADIOLOGY/VASCULAR | Facility: HOSPITAL | Age: 72
End: 2022-11-03
Payer: MEDICARE

## 2022-11-03 NOTE — NURSING
Pt being worked up to schedule FNA of neck mass. Approved by Dr. Topete but per MD, pt needs to hold anticoags. Pt stated she no longer takes eliquis. Ok to proceed with procedure

## 2022-11-08 ENCOUNTER — HOSPITAL ENCOUNTER (OUTPATIENT)
Dept: RADIOLOGY | Facility: HOSPITAL | Age: 72
Discharge: HOME | End: 2022-11-08
Attending: OTOLARYNGOLOGY
Payer: MEDICARE

## 2022-11-08 DIAGNOSIS — R22.1 NECK MASS: ICD-10-CM

## 2022-11-08 PROCEDURE — 88341 IMHCHEM/IMCYTCHM EA ADD ANTB: CPT | Performed by: PATHOLOGY

## 2022-11-08 PROCEDURE — 88342 IMHCHEM/IMCYTCHM 1ST ANTB: CPT | Performed by: PATHOLOGY

## 2022-11-08 PROCEDURE — 27202663 US FINE NEEDLE ASPIRATION BIOPSY, FIRST LESION

## 2022-11-08 PROCEDURE — 88342 IMHCHEM/IMCYTCHM 1ST ANTB: CPT | Mod: 26,,, | Performed by: PATHOLOGY

## 2022-11-08 PROCEDURE — 76942 ECHO GUIDE FOR BIOPSY: CPT | Mod: TC

## 2022-11-08 PROCEDURE — 88305 TISSUE EXAM BY PATHOLOGIST: CPT | Mod: 26,,, | Performed by: PATHOLOGY

## 2022-11-08 PROCEDURE — 88341 IMHCHEM/IMCYTCHM EA ADD ANTB: CPT | Mod: 26,,, | Performed by: PATHOLOGY

## 2022-11-08 PROCEDURE — 76942 ECHO GUIDE FOR BIOPSY: CPT | Mod: 26,,, | Performed by: RADIOLOGY

## 2022-11-08 PROCEDURE — 88305 TISSUE EXAM BY PATHOLOGIST: CPT | Performed by: PATHOLOGY

## 2022-11-08 PROCEDURE — 10005 FNA BX W/US GDN 1ST LES: CPT

## 2022-11-08 PROCEDURE — 88341 PR IHC OR ICC EACH ADD'L SINGLE ANTIBODY  STAINPR: ICD-10-PCS | Mod: 26,,, | Performed by: PATHOLOGY

## 2022-11-08 PROCEDURE — 88342 CHG IMMUNOCYTOCHEMISTRY: ICD-10-PCS | Mod: 26,,, | Performed by: PATHOLOGY

## 2022-11-08 PROCEDURE — 38505 US BIOPSY LYMPH NODES (XPD): ICD-10-PCS | Mod: LT,,, | Performed by: RADIOLOGY

## 2022-11-08 PROCEDURE — 76942 US BIOPSY LYMPH NODES (XPD): ICD-10-PCS | Mod: 26,,, | Performed by: RADIOLOGY

## 2022-11-08 PROCEDURE — 38505 NEEDLE BIOPSY LYMPH NODES: CPT | Mod: LT,,, | Performed by: RADIOLOGY

## 2022-11-08 PROCEDURE — 88305 TISSUE EXAM BY PATHOLOGIST: ICD-10-PCS | Mod: 26,,, | Performed by: PATHOLOGY

## 2022-11-08 RX ORDER — LIDOCAINE HYDROCHLORIDE 10 MG/ML
1 INJECTION INFILTRATION; PERINEURAL ONCE
Status: DISCONTINUED | OUTPATIENT
Start: 2022-11-08 | End: 2022-11-09 | Stop reason: HOSPADM

## 2022-11-11 DIAGNOSIS — C77.0 SECONDARY MALIGNANT NEOPLASM OF CERVICAL LYMPH NODE: Primary | ICD-10-CM

## 2022-11-11 LAB
FINAL PATHOLOGIC DIAGNOSIS: ABNORMAL
GROSS: ABNORMAL
Lab: ABNORMAL
MICROSCOPIC EXAM: ABNORMAL

## 2022-11-15 ENCOUNTER — HOSPITAL ENCOUNTER (OUTPATIENT)
Dept: RADIOLOGY | Facility: HOSPITAL | Age: 72
Discharge: HOME | End: 2022-11-15
Attending: OTOLARYNGOLOGY
Payer: MEDICARE

## 2022-11-15 VITALS — WEIGHT: 214 LBS | HEIGHT: 62 IN | BODY MASS INDEX: 39.38 KG/M2

## 2022-11-15 DIAGNOSIS — C77.0 SECONDARY MALIGNANT NEOPLASM OF CERVICAL LYMPH NODE: ICD-10-CM

## 2022-11-15 LAB — GLUCOSE SERPL-MCNC: 96 MG/DL (ref 70–110)

## 2022-11-15 PROCEDURE — 78815 PET IMAGE W/CT SKULL-THIGH: CPT | Mod: TC,PI,PO

## 2022-11-18 ENCOUNTER — TELEPHONE (OUTPATIENT)
Dept: OTOLARYNGOLOGY | Facility: CLINIC | Age: 72
End: 2022-11-18
Payer: MEDICARE

## 2022-11-23 DIAGNOSIS — C50.919 METASTATIC BREAST CANCER: ICD-10-CM

## 2022-11-23 DIAGNOSIS — G89.4 CHRONIC PAIN SYNDROME: ICD-10-CM

## 2022-11-23 DIAGNOSIS — C50.919 METASTATIC BREAST CANCER: Primary | ICD-10-CM

## 2022-11-23 RX ORDER — HYDROCODONE BITARTRATE AND ACETAMINOPHEN 7.5; 325 MG/1; MG/1
1 TABLET ORAL EVERY 4 HOURS PRN
Qty: 30 TABLET | Refills: 0 | Status: SHIPPED | OUTPATIENT
Start: 2022-11-23 | End: 2022-11-23 | Stop reason: SDUPTHER

## 2022-11-23 RX ORDER — HYDROCODONE BITARTRATE AND ACETAMINOPHEN 7.5; 325 MG/1; MG/1
1 TABLET ORAL EVERY 4 HOURS PRN
Qty: 30 TABLET | Refills: 0 | Status: SHIPPED | OUTPATIENT
Start: 2022-11-23 | End: 2022-11-25 | Stop reason: SDUPTHER

## 2022-11-25 DIAGNOSIS — C50.919 METASTATIC BREAST CANCER: ICD-10-CM

## 2022-11-25 DIAGNOSIS — G89.4 CHRONIC PAIN SYNDROME: ICD-10-CM

## 2022-11-25 RX ORDER — HYDROCODONE BITARTRATE AND ACETAMINOPHEN 7.5; 325 MG/1; MG/1
1 TABLET ORAL EVERY 4 HOURS PRN
Qty: 30 TABLET | Refills: 0 | Status: SHIPPED | OUTPATIENT
Start: 2022-11-25 | End: 2022-11-28 | Stop reason: SDUPTHER

## 2022-11-28 ENCOUNTER — OFFICE VISIT (OUTPATIENT)
Dept: HEMATOLOGY/ONCOLOGY | Facility: CLINIC | Age: 72
End: 2022-11-28
Payer: MEDICARE

## 2022-11-28 VITALS
DIASTOLIC BLOOD PRESSURE: 75 MMHG | BODY MASS INDEX: 39.14 KG/M2 | HEART RATE: 80 BPM | HEIGHT: 62 IN | RESPIRATION RATE: 18 BRPM | SYSTOLIC BLOOD PRESSURE: 114 MMHG | TEMPERATURE: 98 F

## 2022-11-28 DIAGNOSIS — E03.4 HYPOTHYROIDISM DUE TO ACQUIRED ATROPHY OF THYROID: Primary | ICD-10-CM

## 2022-11-28 DIAGNOSIS — C50.919 METASTATIC BREAST CANCER: ICD-10-CM

## 2022-11-28 DIAGNOSIS — G89.4 CHRONIC PAIN SYNDROME: ICD-10-CM

## 2022-11-28 PROCEDURE — 99214 OFFICE O/P EST MOD 30 MIN: CPT | Mod: S$GLB,,, | Performed by: INTERNAL MEDICINE

## 2022-11-28 PROCEDURE — 99214 PR OFFICE/OUTPT VISIT, EST, LEVL IV, 30-39 MIN: ICD-10-PCS | Mod: S$GLB,,, | Performed by: INTERNAL MEDICINE

## 2022-11-28 RX ORDER — HYDROCODONE BITARTRATE AND ACETAMINOPHEN 7.5; 325 MG/1; MG/1
1 TABLET ORAL EVERY 6 HOURS PRN
Qty: 120 TABLET | Refills: 0 | Status: SHIPPED | OUTPATIENT
Start: 2022-11-28 | End: 2022-12-28

## 2022-11-28 RX ORDER — LEVOTHYROXINE SODIUM 125 UG/1
125 TABLET ORAL DAILY
Qty: 30 TABLET | Refills: 11 | Status: SHIPPED | OUTPATIENT
Start: 2022-11-28 | End: 2023-11-28

## 2022-12-01 NOTE — PROGRESS NOTES
Leyda Bravo  [id number]  1950    DIAGNOSIS: Metastatic breast cancer    HISTORY OF PRESENT ILLNESS:   Ms. Bravo is a 72F previously seen in our clinic for a stage IA breast ILC in 2022, for which she did not show any indications for PMRT. Her oncotype score was 7, so no adj chemo was recommended either.  Unfortunately, she more recently p/w onset off bilateral hip pain and a swelling of her left neck, and biopsy of neck LAD showed adenoCA consistent w/ breast primary.  And subsequent PET/CT showed diffuse metastatic disease, including a large lower T-spine mass encroaching on spinal canal/cord and bilateral hip/pelvic masses.  Thus she has been referred to [facility] re: palliative RT options.    REVIEW OF SYSTEMS:  A complete review of systems was performed and the patient denies any acute concerns/changes other than per HPI    Past Medical History:   Diagnosis Date    Hypertension     Obese     Thyroid disease     hypothyroidism     Past Surgical History:   Procedure Laterality Date     SECTION      CHOLECYSTECTOMY      COLON SURGERY      colostomy    HERNIA REPAIR      KIDNEY SURGERY Left 2006    removal     Social History     Socioeconomic History    Marital status:    Tobacco Use    Smoking status: Never    Smokeless tobacco: Never   Substance and Sexual Activity    Alcohol use: No    Drug use: No     No family history on file.  Medication List with Changes/Refills   Current Medications    ATENOLOL (TENORMIN) 25 MG TABLET    Take 1 tablet (25 mg total) by mouth once daily.    HYDROCODONE-ACETAMINOPHEN (NORCO) 7.5-325 MG PER TABLET    Take 1 tablet by mouth every 6 (six) hours as needed for Pain.    LEVOTHYROXINE (SYNTHROID) 125 MCG TABLET    Take 1 tablet (125 mcg total) by mouth once daily.    NAPROXEN SODIUM (ALEVE ORAL)    Take by mouth.     Review of patient's allergies indicates:  No Known Allergies    QUALITY OF LIFE: 60%- Requires Occasional Assistance but is Able to Care  for Needs    There were no vitals filed for this visit.  There is no height or weight on file to calculate BMI.    PHYSICAL EXAM:  GENERAL: alert; in no apparent distress.   HEAD: normocephalic, atraumatic.  EYES: pupils are equal, round, reactive to light and accommodation. Sclera anicteric. Conjunctiva not injected.   NOSE/THROAT: no nasal erythema or rhinorrhea. Oropharynx pink, without erythema, ulcerations or thrush.   NECK: no cervical motion rigidity; supple with no masses.  CHEST: clear to auscultation bilaterally; no wheezes, crackles or rubs. Patient is speaking comfortably on room air with normal work of breathing without using accessory muscles of respiration.  CARDIOVASCULAR: regular rate and rhythm; no murmurs, rubs or gallops.  ABDOMEN: soft, nontender, nondistended. Bowel sounds present.   MUSCULOSKELETAL: no tenderness to palpation along the spine or scapulae. Normal range of motion.  NEUROLOGIC: cranial nerves II-XII intact bilaterally. Strength 5/5 in bilateral upper and lower extremities. No sensory deficits appreciated. Reflexes globally intact. No cerebellar signs. Normal gait.  LYMPHATIC: no cervical, supraclavicular or axillary adenopathy appreciated bilaterally.   EXTREMITIES: no clubbing, cyanosis, edema.  SKIN: no erythema, rashes, ulcerations noted.       ASSESSMENT: Leyda Bravo is a 72 y.o. female with stage IV breast cancer p/w bilateral hip pain and large T-spine mass    PLAN:   - plan for palliative RT to lower T-spine and bilateral hips @ 3000 cGy in 10 fxns  - f/u w/ MedOnc re: endocrine/systemic therapy recommendations    The patient verbalized understanding of the above plan, risks, benefits, and details, and informed consent was obtained.  We will proceed with RTP-CT imaging with plans to initiate RT w/in the next week.    All questions answered and contact information provided. Patient understands free to call us anytime with any questions or concerns regarding radiation  therapy.    I have personally seen and evaluated this patient. Greater than 50% of this time was spent discussing coordination of care and/or counseling.    PHYSICIAN: Oneal Raphael III, MD

## 2022-12-02 ENCOUNTER — OFFICE VISIT (OUTPATIENT)
Dept: RADIATION ONCOLOGY | Facility: CLINIC | Age: 72
End: 2022-12-02
Payer: MEDICARE

## 2022-12-02 ENCOUNTER — TELEPHONE (OUTPATIENT)
Dept: HEMATOLOGY/ONCOLOGY | Facility: CLINIC | Age: 72
End: 2022-12-02

## 2022-12-02 VITALS
SYSTOLIC BLOOD PRESSURE: 118 MMHG | TEMPERATURE: 98 F | HEART RATE: 85 BPM | OXYGEN SATURATION: 96 % | DIASTOLIC BLOOD PRESSURE: 81 MMHG | RESPIRATION RATE: 16 BRPM

## 2022-12-02 DIAGNOSIS — C50.919 METASTATIC BREAST CANCER: Primary | ICD-10-CM

## 2022-12-02 DIAGNOSIS — C50.811 MALIGNANT NEOPLASM OF OVERLAPPING SITES OF RIGHT BREAST IN FEMALE, ESTROGEN RECEPTOR POSITIVE: Primary | ICD-10-CM

## 2022-12-02 DIAGNOSIS — Z17.0 MALIGNANT NEOPLASM OF OVERLAPPING SITES OF RIGHT BREAST IN FEMALE, ESTROGEN RECEPTOR POSITIVE: Primary | ICD-10-CM

## 2022-12-02 PROCEDURE — 99215 PR OFFICE/OUTPT VISIT, EST, LEVL V, 40-54 MIN: ICD-10-PCS | Mod: S$GLB,,, | Performed by: RADIOLOGY

## 2022-12-02 PROCEDURE — 99215 OFFICE O/P EST HI 40 MIN: CPT | Mod: S$GLB,,, | Performed by: RADIOLOGY

## 2022-12-04 ENCOUNTER — TELEPHONE (OUTPATIENT)
Dept: HEMATOLOGY/ONCOLOGY | Facility: HOSPITAL | Age: 72
End: 2022-12-04

## 2022-12-04 NOTE — TELEPHONE ENCOUNTER
Orders for Kisquali/Letrozole sent to [facility].    Confirm they have the Rx for her.

## 2022-12-04 NOTE — PROGRESS NOTES
[facility] in office hematology Oncology Subsequent  encounter note  11/28/22    Subjective:      Patient ID:   Leyda Bravo  72 y.o. female  1950  HEIDI Brink MD      Chief Complaint:   DVT    HPI:  72 y.o. female  had a near syncopal episode, 2 or 3 weeks later she was found to have extensive right and left leg DVT on May 17, 2021.   She also had shortness of breath symptoms, however I have not documented the finding of a pulmonary embolus.  She was treated with heparin intravenously and transitioned over to Eliquis 5 mg 1 p.o. b.i.d.  She took Eliquis x's 6 months.    She complains of low back pain chronically and x-ray study showed that she had some degenerative joint disease involving her lower lumbar vertebrae probably accounting for her symptoms.    Other findings included a 1.7 cm mass in the left breast and an 11 mm nodule in the pancreas and a small lung  Nodule of 3 mm in size.    Mammogram and U/S support that L breast nodule is benign, but nodule found at areola of R breast.  Bx of R breast mass  Showed Invasive lobular Carcinoma, LCIS, tumor 1.1 cm, grade 1, ERP+, PRP+,   H2N neg.    Discussed R lumpectomy, Sentinal node Bx and adjuvant Rad Rx.  Vs R mastectomy, Sentinal Node Bx.  Refer to Dr. Zac Odom of surgery.  For surgery 10/12/21.  BrCa1 & 2 Negative.  11/9/21.  She saw  Dr. Raphael no Rad Rx recommended.    Last seen 1/2022.  Referred back with recurrent metastatic dx.  Hip pain x's 2 months. L neck anterior cervical LN x's 1-2 months.  Dr. Street bx, metastatic breast cancer.  S/P R mastectomy.  RLL 12 mm nodule, metastatis dx?  Collected CBC, CMP, tumor markers and Foundation One Study.    Has history of colitis and had total colectomy in 2005, her course was complicated by a pulmonary embolus.  She had hernia repair in 2016 and had complications with wound dehiscence and her course was complicated by a pulmonary embolus.  Other history includes Caesarean  section x1, cholecystectomy, left nephrectomy for stones in , hernia repair x2.  She is a  3 para 3 miscarriage 0 individual    She she has history of hypertension, heart disease, arthritis in the knees hips and ankles.  Skin cancer has removed from the face in the past and from the shoulder area.  She has hypothyroidism.  Of    She smoked from age [age] for approximately 20 years, smoking 1 pack per day.  She does not drink alcohol with regularity.  She denies allergies to medications.  She worked as a garment .    Two of her sisters have had miscarriages, there is no family history of PE or DVT.  Family history does include heart disease, colitis, diabetes, renal disease.  She has a daughter who had ovarian cancer, a daughter who had anemia and her mom  had heart disease.    Her daughter with her today, reports she has a L breast lump.  She has a palpable mass at 12 o'clock of L breast.   Mamm and U/S.  Daughter Sam Tinajero 81 had breast bx, path pending.      ROS:   GEN: normal without any fever, night sweats or weight loss  HEENT: normal with no HA's, sore throat, stiff neck, changes in vision  CV: normal with no CP, SOB, PND, GROVE or orthopnea  PULM: normal with no SOB, cough, hemoptysis, sputum or pleuritic pain  GI: normal with no abdominal pain, nausea, vomiting, constipation, diarrhea, melanotic stools, BRBPR, or hematemesis  : normal with no hematuria, dysuria  BREAST: normal with no mass, discharge, pain  SKIN: normal with no rash, erythema, bruising, or swelling     Past Medical History:   Diagnosis Date    Breast cancer     Hypertension     Obese     Thyroid disease     hypothyroidism     Past Surgical History:   Procedure Laterality Date     SECTION      CHOLECYSTECTOMY      COLON SURGERY      colostomy    HERNIA REPAIR      KIDNEY SURGERY Left 2006    removal       Review of patient's allergies indicates:  No Known Allergies  Social History     Socioeconomic  "History    Marital status:    Tobacco Use    Smoking status: Never    Smokeless tobacco: Never   Substance and Sexual Activity    Alcohol use: No    Drug use: No         Current Outpatient Medications:     NAPROXEN SODIUM (ALEVE ORAL), Take by mouth., Disp: , Rfl:     atenolol (TENORMIN) 25 MG tablet, Take 1 tablet (25 mg total) by mouth once daily., Disp: 30 tablet, Rfl: 11    HYDROcodone-acetaminophen (NORCO) 7.5-325 mg per tablet, Take 1 tablet by mouth every 6 (six) hours as needed for Pain., Disp: 120 tablet, Rfl: 0    levothyroxine (SYNTHROID) 125 MCG tablet, Take 1 tablet (125 mcg total) by mouth once daily., Disp: 30 tablet, Rfl: 11          Objective:   Vitals:  Blood pressure 114/75, pulse 80, temperature 97.8 °F (36.6 °C), resp. rate 18, height 5' 2" (1.575 m).    Physical Examination:   GEN: no apparent distress, comfortable  HEAD: atraumatic and normocephalic  EYES: no pallor, no icterus  ENT: no pharyngeal erythema, external ears WNL; no nasal discharge  NECK: no masses, thyroid normal, trachea midline, no LAD/LN's, supple  CV: RRR with no murmur; normal pulse; normal S1 and S2; no pedal edema  CHEST: Normal respiratory effort; CTAB; normal breath sounds; no wheeze or crackles  ABDOM: nontender and nondistended; soft;  no rebound/guarding, L/S NP  MUSC/Skeletal: ROM normal; no crepitus; joints normal;   EXTREM:  She has extensive spider veins at the lower extremities, calves are nontender, without palpable venous cord, she does have 1+ edema bilaterally.  SKIN: no rashes, lesions, ulcers, petechiae   : no cvat  NEURO: grossly intact; motor/sensory WNL;  no tremors  PSYCH: normal mood, affect and behavior  LYMPH: normal cervical, supraclavicular, axillary and groin LN's  BREASTS:  Left and right breast are nontender, without discharge, and without palpable mass.    Labs:   Lab Results   Component Value Date    WBC 8.79 10/15/2022    HGB 14.6 10/15/2022    HCT 45.3 10/15/2022    MCV 94 " 10/15/2022     10/15/2022    CMP  Sodium   Date Value Ref Range Status   10/15/2022 138 136 - 145 mmol/L Final     Potassium   Date Value Ref Range Status   10/15/2022 4.5 3.5 - 5.1 mmol/L Final     Chloride   Date Value Ref Range Status   10/15/2022 101 95 - 110 mmol/L Final     CO2   Date Value Ref Range Status   10/15/2022 26 23 - 29 mmol/L Final     Glucose   Date Value Ref Range Status   10/15/2022 111 (H) 70 - 110 mg/dL Final     BUN   Date Value Ref Range Status   10/15/2022 11 8 - 23 mg/dL Final     Creatinine   Date Value Ref Range Status   10/15/2022 0.9 0.5 - 1.4 mg/dL Final     Calcium   Date Value Ref Range Status   10/15/2022 9.8 8.7 - 10.5 mg/dL Final     Total Protein   Date Value Ref Range Status   10/15/2022 7.4 6.0 - 8.4 g/dL Final     Albumin   Date Value Ref Range Status   10/15/2022 3.6 3.5 - 5.2 g/dL Final     Total Bilirubin   Date Value Ref Range Status   10/15/2022 1.0 0.1 - 1.0 mg/dL Final     Comment:     For infants and newborns, interpretation of results should be based  on gestational age, weight and in agreement with clinical  observations.    Premature Infant recommended reference ranges:  Up to 24 hours.............<8.0 mg/dL  Up to 48 hours............<12.0 mg/dL  3-5 days..................<15.0 mg/dL  6-29 days.................<15.0 mg/dL       Alkaline Phosphatase   Date Value Ref Range Status   10/15/2022 86 55 - 135 U/L Final     AST   Date Value Ref Range Status   10/15/2022 19 10 - 40 U/L Final     ALT   Date Value Ref Range Status   10/15/2022 15 10 - 44 U/L Final     Anion Gap   Date Value Ref Range Status   10/15/2022 11 8 - 16 mmol/L Final     eGFR if    Date Value Ref Range Status   06/14/2021 >60.0 >60 mL/min/1.73 m^2 Final     eGFR if non    Date Value Ref Range Status   06/14/2021 >60.0 >60 mL/min/1.73 m^2 Final     Comment:     Calculation used to obtain the estimated glomerular filtration  rate (eGFR) is the CKD-EPI equation.       Recent lab from June 14, 2021 shows a white blood count of 9900, hemoglobin 12.2, hematocrit 37.5, MCV 97, platelet count 173,000. Differential is normal.  Creatinine is 0.9, albumin is 3.3, LFTs are normal.      She is hypercoagulation evaluation and 2017 showed that she was homozygous positive for MTHFR mutation and her homocystine level was increased at 22.  At that time she was started on Metanx daily.     A CTA runoff study was done June 14, 2021 at [facility].  This study showed vascular disease involving the aorta and pelvic and lower extremity arteries., fatty liver was seen, vena cava filter was in place, prominence of pelvic veins was noted.  11 mm low-density mass in the body of the pancreas was seen.  Large right abdominal hernia was noted without obstruction.      Previous CT scan of lumbar spine show mild multilevel degeneration with facet arthropathy.  CT scan of the chest from June 8, 2021 showed bronchitis without pulmonary embolus being seen.  A 17 mm nodule was noted in the left breast.    Lower extremity venous study showed acute occlusive deep vein thrombosis bilaterally with clot in the common and superficial femoral veins, popliteal vein, extending into the calves bilaterally.  Right greater saphenous vein was occluded.      Partial Mastectomy 2.3 cm lobular invasive Ca, grade 2 dx, 1/1 sentinal LN negative, possitive margin.    Mastectomy, no residual dx.    ERP+, PRP+, H2N negative.    Stage 2 Dx by primary size.    Submit for Oncotype Dx testing regards adjuvant hormonal and chemotherapy.      Assessment:   (1) 72 y.o. female with diagnosis of extensive bilateral DVT, timely related to a previous fall.  Presently treated with Eliquis 5 mg p.o. b.i.d., clinically improving with decrease symptoms and decreased physical findings at the left and right leg.  History of pulmonary emboli in the past following surgical procedures.    (2) previous hypercoagulation evaluation showed she  was double homozygous positive with MTHFR mutation and homocystine level was increased at 22. I started her on Metanx at that time, and last saw her in follow-up in 2017.     She is on Eliquis b.i.d. at this time.  I plan to repeat her venous Doppler studies of the legs mid 9/2021  to confirm that the clot has resolved. And check CTA of chest now.   Because of her history of recurrence clots, I would plan to maintain her on Eliquis chronically as prophylaxis against clot events.    (3)R breast Invasive Lobular Ca, LCIS.  Daughter with L breast mass.  Daughter with ovarian cancer hx.  BRCA 1 & 2, breast cancer gene, HH.  Negative.    She has pancreas mass, on previous study.      (4)Metastatic breast cancer.  Dr. Raphael for Rad Rx. To areas of bone pain or impending Fx risk.    (5)Treatment option of Faslodex, Riky discussed with her.  Xgeva monthly.    RTC 1 week.

## 2022-12-05 ENCOUNTER — SPECIALTY PHARMACY (OUTPATIENT)
Dept: PHARMACY | Facility: CLINIC | Age: 72
End: 2022-12-05
Payer: MEDICARE

## 2022-12-05 NOTE — TELEPHONE ENCOUNTER
Incoming call from [facility] to check status on Kisqali Rx. Informed it was received yesterday and discussed the PA process. Routing [facility] team for assigning.

## 2022-12-06 ENCOUNTER — OFFICE VISIT (OUTPATIENT)
Dept: HEMATOLOGY/ONCOLOGY | Facility: CLINIC | Age: 72
End: 2022-12-06
Payer: MEDICARE

## 2022-12-06 VITALS
DIASTOLIC BLOOD PRESSURE: 63 MMHG | RESPIRATION RATE: 18 BRPM | HEART RATE: 80 BPM | TEMPERATURE: 98 F | BODY MASS INDEX: 39.14 KG/M2 | SYSTOLIC BLOOD PRESSURE: 110 MMHG | HEIGHT: 62 IN

## 2022-12-06 DIAGNOSIS — G89.4 CHRONIC PAIN SYNDROME: ICD-10-CM

## 2022-12-06 DIAGNOSIS — C50.811 MALIGNANT NEOPLASM OF OVERLAPPING SITES OF RIGHT BREAST IN FEMALE, ESTROGEN RECEPTOR POSITIVE: ICD-10-CM

## 2022-12-06 DIAGNOSIS — Z17.0 MALIGNANT NEOPLASM OF OVERLAPPING SITES OF RIGHT BREAST IN FEMALE, ESTROGEN RECEPTOR POSITIVE: ICD-10-CM

## 2022-12-06 DIAGNOSIS — C50.919 METASTATIC BREAST CANCER: Primary | ICD-10-CM

## 2022-12-06 PROCEDURE — 77263 THER RADIOLOGY TX PLNG CPLX: CPT | Mod: S$GLB,,, | Performed by: RADIOLOGY

## 2022-12-06 PROCEDURE — 77263 PR  RADIATION THERAPY PLAN COMPLEX: ICD-10-PCS | Mod: S$GLB,,, | Performed by: RADIOLOGY

## 2022-12-06 PROCEDURE — 99213 PR OFFICE/OUTPT VISIT, EST, LEVL III, 20-29 MIN: ICD-10-PCS | Mod: S$GLB,,, | Performed by: INTERNAL MEDICINE

## 2022-12-06 PROCEDURE — 99213 OFFICE O/P EST LOW 20 MIN: CPT | Mod: S$GLB,,, | Performed by: INTERNAL MEDICINE

## 2022-12-06 NOTE — TELEPHONE ENCOUNTER
Incoming call from Nurse at [facility] inquiring about Kisqali prescription stating that patient informed them that a generic Rx was needed.    Per [facility], Kisqali PA was denied and the denial was appealed today; decision pending. Also, might be best to separate the Kisqali and Letrozole into separate scripts. Advised Nurse of this info and she stated that she will discuss with provider.

## 2022-12-06 NOTE — TELEPHONE ENCOUNTER
[facility] received prescription on 12/4 when office was closed. Submitted PA for Kisqali on 12/5 as urgent and awaiting response from insurance.

## 2022-12-07 DIAGNOSIS — C50.919 METASTATIC BREAST CANCER: Primary | ICD-10-CM

## 2022-12-07 RX ORDER — LETROZOLE 2.5 MG/1
2.5 TABLET, FILM COATED ORAL DAILY
Qty: 30 TABLET | Refills: 6 | Status: SHIPPED | OUTPATIENT
Start: 2022-12-07 | End: 2023-12-07

## 2022-12-07 NOTE — TELEPHONE ENCOUNTER
Received approval for Kisqali from 12/06/2022 until further notice. Auth #: [id number]    BI: COMPLETE     MEDICARE:  Estimated copay: $4  In Network: yes  PA approval on file: until further notice  LIS level: yes

## 2022-12-08 ENCOUNTER — SPECIALTY PHARMACY (OUTPATIENT)
Dept: PHARMACY | Facility: CLINIC | Age: 72
End: 2022-12-08
Payer: MEDICARE

## 2022-12-08 DIAGNOSIS — C50.919 METASTATIC BREAST CANCER: Primary | ICD-10-CM

## 2022-12-08 RX ORDER — ONDANSETRON HYDROCHLORIDE 8 MG/1
8 TABLET, FILM COATED ORAL EVERY 8 HOURS PRN
Qty: 30 TABLET | Refills: 2 | Status: SHIPPED | OUTPATIENT
Start: 2022-12-08 | End: 2023-12-08

## 2022-12-08 NOTE — TELEPHONE ENCOUNTER
Specialty Pharmacy - Initial Clinical Assessment    Specialty Medication Orders Linked to Encounter      Flowsheet Row Most Recent Value   Medication #1 ribociclib (KISQALI) 600 mg/day (200 mg x 3) Tab (Order#[id number], Rx#[id number])   Medication #2 letrozole (FEMARA) 2.5 mg Tab (Order#[id number], Rx#[id number])          Patient Diagnosis   C50.919 - Malignant neoplasm of breast (female)    Meet Bravo is a 72 y.o. female, who is followed by the specialty pharmacy service for management and education.    Recent Encounters       Date Type Provider Description    2022 Specialty Pharmacy Bertha Montano PharmD Initial Clinical Assessment    2022 Specialty Pharmacy Roshan Lucia PharmD Referral Authorization          Clinical call attempts since last clinical assessment   No call attempts found.     Current Outpatient Medications   Medication Sig    atenolol (TENORMIN) 25 MG tablet Take 1 tablet (25 mg total) by mouth once daily.    HYDROcodone-acetaminophen (NORCO) 7.5-325 mg per tablet Take 1 tablet by mouth every 6 (six) hours as needed for Pain.    letrozole (FEMARA) 2.5 mg Tab Take 1 tablet (2.5 mg total) by mouth once daily.    levothyroxine (SYNTHROID) 125 MCG tablet Take 1 tablet (125 mcg total) by mouth once daily.    NAPROXEN SODIUM (ALEVE ORAL) Take by mouth.    ribociclib (KISQALI) 600 mg/day (200 mg x 3) Tab Take 3 tablets (600 mg total) by mouth once daily for 3 weeks (21 days), take 1 week (7 days) off, repeat every 28 days   Last reviewed on 2022  1:52 PM by Bertha Montano PharmD    Review of patient's allergies indicates:  No Known AllergiesLast reviewed on  2022 1:52 PM by Bertha Montano    Drug Interactions    Drug interactions evaluated: yes  Clinically relevant drug interactions identified: yes   Interactions list: Cat C: Piqray and hydrocodone/acetaminophen - CY Inhibitors (Moderate) may increase the serum concentration of HYDROcodone  Patient  Management:  Monitor patients for increased hydrocodone toxicities, including fatal respiratory depression, if combined with a moderate CY inhibitor. Monitor patients at frequent intervals and consider hydrocodone dose reductions until stable drug effects are achieved. Some non-US labels recommend avoiding this combination when possible       Drug management plan: Cat C: Piqray and hydrocodone/acetaminophen - CY Inhibitors (Moderate) may increase the serum concentration of HYDROcodone  Patient Management:  Monitor patients for increased hydrocodone toxicities, including fatal respiratory depression, if combined with a moderate CY inhibitor. Monitor patients at frequent intervals and consider hydrocodone dose reductions until stable drug effects are achieved. Some non-US labels recommend avoiding this combination when possible            Adverse Effects    Back pain: Pos  Constitution: System reviewed and is negative  Skin: System reviewed and is negative  Eyes: System reviewed and is negative  Endocrine and Metabolic: System reviewed and is negative  Gastrointestinal: System reviewed and is negative  Genitourinary: System reviewed and is negative  Cardiovascular: System reviewed and is negative  Hematologic: System reviewed and is negative  HENT: System reviewed and is negative  Neurological: System reviewed and is negative  Psychiatric: System reviewed and is negative  Respiratory: System reviewed and is negative       Assessment Questions - Documented Responses      Flowsheet Row Most Recent Value   Assessment    Medication Reconciliation completed for patient Yes   During the past 4 weeks, has patient missed any activities due to condition or medication? No   During the past 4 weeks, did patient have any of the following urgent care visits? None   Goals of Therapy Status Discussed (new start)   Status of the patients ability to self-administer: Is Able   All education points have been covered with  "patient? Yes, supplemental printed education provided   Welcome packet contents reviewed and discussed with patient? Yes   Assesment completed? Yes   Plan Therapy being initiated   Do you need to open a clinical intervention (i-vent)? No   Do you want to schedule first shipment? Yes   Medication #1 Assessment Info    Patient status New medication, New to [facility]   Is this medication appropriate for the patient? Yes   Is this medication effective? Not yet started   Medication #2 Assessment Info    Patient status New medication, New to [facility]   Is this medication appropriate for the patient? Yes   Is this medication effective? Not yet started          Refill Questions - Documented Responses      Flowsheet Row Most Recent Value   Patient Availability and HIPAA Verification    Does patient want to proceed with activity? Yes   HIPAA/medical authority confirmed? Yes   Relationship to patient of person spoken to? Self   Refill Screening Questions    When does the patient need to receive the medication? 12/13/22   Refill Delivery Questions    How will the patient receive the medication? Mail   When does the patient need to receive the medication? 12/13/22   Shipping Address Home   Address in [facility] confirmed and updated if neccessary? Yes   Expected Copay ($) 4   Is the patient able to afford the medication copay? Yes   Payment Method CC on file   Days supply of Refill 28   Supplies needed? No supplies needed   Refill activity completed? Yes   Refill activity plan Refill scheduled   Shipment/Pickup Date: 12/12/22            Objective    She has a past medical history of Breast cancer, Hypertension, Obese, and Thyroid disease.    Tried/failed medications: N/A    BP Readings from Last 4 Encounters:   12/06/22 110/63   12/02/22 118/81   11/28/22 114/75   10/26/22 113/61     Ht Readings from Last 4 Encounters:   12/06/22 5' 2" (1.575 m)   11/28/22 5' 2" (1.575 m)   11/15/22 5' 2" (1.575 m)   10/15/22 5' 2" (1.575 m)     Wt " Readings from Last 4 Encounters:   11/15/22 97.1 kg (214 lb)   10/15/22 97.1 kg (214 lb)   01/06/22 105.7 kg (233 lb)   01/06/22 105.7 kg (233 lb)     Recent Labs   Lab Result Units 10/15/22  1845   RBC M/uL 4.83   Hemoglobin g/dL 14.6   Hematocrit % 45.3   WBC K/uL 8.79   Gran # (ANC) K/uL 6.4   Gran % % 73.1 H   Platelets K/uL 220   Sodium mmol/L 138   Potassium mmol/L 4.5   Chloride mmol/L 101   Glucose mg/dL 111 H   BUN mg/dL 11   Creatinine mg/dL 0.9   Calcium mg/dL 9.8   Total Protein g/dL 7.4   Albumin g/dL 3.6   Total Bilirubin mg/dL 1.0   Alkaline Phosphatase U/L 86   AST U/L 19   ALT U/L 15     The goals of cancer treatment include:  Achieving remission of cancer, if possible  Reducing tumor size and spread of cancer, if remission is not possible  Minimizing pain and symptoms of the cancer  Preventing infection and other complications of treatment  Promoting adequate nutrition  Encouraging proper hydration  Improving or maintaining quality of life  Maintaining optimal therapy adherence  Minimizing and managing side effects    Goals of Therapy Status: Discussed (new start)    Assessment/Plan  Patient plans to start therapy on 12/13/22      Indication, dosage, appropriateness, effectiveness, safety and convenience of her specialty medication(s) were reviewed today.     Patient Education   Patient received education on the following:   Expectations and possible outcomes of therapy  Proper use, timely administration, and missed dose management  Duration of therapy  Side effects, including prevention, minimization, and management  Contraindications and safety precautions  New or changed medications, including prescribe and over the counter medications and supplements  Reviews recommended vaccinations, as appropriate  Storage, safe handling, and disposal    Patient did not have cc # with her. She will give [facility] a call back to provide cc # to process copay. She will reach out to MD's office once she receives the  medication to get start date. Will message MD to send anti-nausea medication to local pharmacy if appropriate. Also messaging MD about getting EKG monitoring (iVent)    Tasks added this encounter   1/3/2023 - Refill Call (Auto Added)  5/30/2023 - Clinical - Follow Up Assesement (180 day)   Tasks due within next 3 months   No tasks due.     Bertha Montano, PharmD  [facility]  [address]  [facility] [address]  Phone: [phone]  Fax: [phone]

## 2022-12-14 DIAGNOSIS — C50.919 METASTATIC BREAST CANCER: Primary | ICD-10-CM

## 2022-12-14 NOTE — TELEPHONE ENCOUNTER
----- Message from Erika Mcallister sent at 12/12/2022  9:07 AM CST -----  Pt was in to see doc on Tuesday and his notes say to start trmt tomorrow 12/13. Could you call her daughter Katherine and explain next steps.  [phone]

## 2022-12-14 NOTE — TELEPHONE ENCOUNTER
Attempted to call daughter to inform stronger pain medication would be ordered. No answer. No VM set up.

## 2022-12-15 RX ORDER — HYDROCODONE BITARTRATE AND ACETAMINOPHEN 10; 325 MG/1; MG/1
1 TABLET ORAL EVERY 6 HOURS PRN
Qty: 120 TABLET | Refills: 0 | Status: SHIPPED | OUTPATIENT
Start: 2022-12-15

## 2022-12-17 ENCOUNTER — HOSPITAL ENCOUNTER (INPATIENT)
Facility: HOSPITAL | Age: 72
LOS: 1 days | Discharge: HOSPICE | DRG: 552 | End: 2022-12-20
Attending: EMERGENCY MEDICINE | Admitting: INTERNAL MEDICINE
Payer: MEDICARE

## 2022-12-17 DIAGNOSIS — M54.9 INTRACTABLE BACK PAIN: ICD-10-CM

## 2022-12-17 DIAGNOSIS — C50.919 METASTATIC BREAST CANCER: ICD-10-CM

## 2022-12-17 DIAGNOSIS — R07.9 CHEST PAIN: ICD-10-CM

## 2022-12-17 DIAGNOSIS — Z86.718 HISTORY OF DVT (DEEP VEIN THROMBOSIS): ICD-10-CM

## 2022-12-17 DIAGNOSIS — R52 INTRACTABLE PAIN: Primary | ICD-10-CM

## 2022-12-17 DIAGNOSIS — C79.51 BONY METASTASIS: ICD-10-CM

## 2022-12-17 LAB
ALBUMIN SERPL BCP-MCNC: 3.4 G/DL (ref 3.5–5.2)
ALP SERPL-CCNC: 101 U/L (ref 55–135)
ALT SERPL W/O P-5'-P-CCNC: 15 U/L (ref 10–44)
ANION GAP SERPL CALC-SCNC: 11 MMOL/L (ref 8–16)
AST SERPL-CCNC: 17 U/L (ref 10–40)
BASOPHILS # BLD AUTO: 0.02 K/UL (ref 0–0.2)
BASOPHILS NFR BLD: 0.3 % (ref 0–1.9)
BILIRUB SERPL-MCNC: 1.1 MG/DL (ref 0.1–1)
BUN SERPL-MCNC: 8 MG/DL (ref 8–23)
CALCIUM SERPL-MCNC: 9.3 MG/DL (ref 8.7–10.5)
CHLORIDE SERPL-SCNC: 99 MMOL/L (ref 95–110)
CK SERPL-CCNC: 23 U/L (ref 20–180)
CO2 SERPL-SCNC: 26 MMOL/L (ref 23–29)
CREAT SERPL-MCNC: 0.7 MG/DL (ref 0.5–1.4)
CREAT SERPL-MCNC: 0.8 MG/DL (ref 0.5–1.4)
DIFFERENTIAL METHOD: NORMAL
EOSINOPHIL # BLD AUTO: 0.1 K/UL (ref 0–0.5)
EOSINOPHIL NFR BLD: 1.6 % (ref 0–8)
ERYTHROCYTE [DISTWIDTH] IN BLOOD BY AUTOMATED COUNT: 13.4 % (ref 11.5–14.5)
EST. GFR  (NO RACE VARIABLE): >60 ML/MIN/1.73 M^2
GLUCOSE SERPL-MCNC: 117 MG/DL (ref 70–110)
HCT VFR BLD AUTO: 41.8 % (ref 37–48.5)
HGB BLD-MCNC: 13.7 G/DL (ref 12–16)
IMM GRANULOCYTES # BLD AUTO: 0.02 K/UL (ref 0–0.04)
IMM GRANULOCYTES NFR BLD AUTO: 0.3 % (ref 0–0.5)
INFLUENZA A, MOLECULAR: NEGATIVE
INFLUENZA B, MOLECULAR: NEGATIVE
LACTATE SERPL-SCNC: 1.5 MMOL/L (ref 0.5–1.9)
LIPASE SERPL-CCNC: 30 U/L (ref 4–60)
LYMPHOCYTES # BLD AUTO: 1.5 K/UL (ref 1–4.8)
LYMPHOCYTES NFR BLD: 19.3 % (ref 18–48)
MAGNESIUM SERPL-MCNC: 1.6 MG/DL (ref 1.6–2.6)
MCH RBC QN AUTO: 30.4 PG (ref 27–31)
MCHC RBC AUTO-ENTMCNC: 32.8 G/DL (ref 32–36)
MCV RBC AUTO: 93 FL (ref 82–98)
MONOCYTES # BLD AUTO: 0.7 K/UL (ref 0.3–1)
MONOCYTES NFR BLD: 8.2 % (ref 4–15)
NEUTROPHILS # BLD AUTO: 5.6 K/UL (ref 1.8–7.7)
NEUTROPHILS NFR BLD: 70.3 % (ref 38–73)
NRBC BLD-RTO: 0 /100 WBC
PLATELET # BLD AUTO: 182 K/UL (ref 150–450)
PMV BLD AUTO: 9.6 FL (ref 9.2–12.9)
POTASSIUM SERPL-SCNC: 3.7 MMOL/L (ref 3.5–5.1)
PROCALCITONIN SERPL IA-MCNC: 4.13 NG/ML (ref 0–0.5)
PROT SERPL-MCNC: 7.2 G/DL (ref 6–8.4)
RBC # BLD AUTO: 4.5 M/UL (ref 4–5.4)
SAMPLE: NORMAL
SARS-COV-2 RDRP RESP QL NAA+PROBE: NEGATIVE
SODIUM SERPL-SCNC: 136 MMOL/L (ref 136–145)
SPECIMEN SOURCE: NORMAL
TROPONIN I SERPL HS-MCNC: 4.4 PG/ML (ref 0–14.9)
TROPONIN I SERPL HS-MCNC: 4.5 PG/ML (ref 0–14.9)
TSH SERPL DL<=0.005 MIU/L-ACNC: 2.57 UIU/ML (ref 0.34–5.6)
WBC # BLD AUTO: 7.97 K/UL (ref 3.9–12.7)

## 2022-12-17 PROCEDURE — 84443 ASSAY THYROID STIM HORMONE: CPT | Performed by: EMERGENCY MEDICINE

## 2022-12-17 PROCEDURE — G0378 HOSPITAL OBSERVATION PER HR: HCPCS

## 2022-12-17 PROCEDURE — 93005 ELECTROCARDIOGRAM TRACING: CPT | Performed by: INTERNAL MEDICINE

## 2022-12-17 PROCEDURE — 83690 ASSAY OF LIPASE: CPT | Performed by: EMERGENCY MEDICINE

## 2022-12-17 PROCEDURE — 99285 EMERGENCY DEPT VISIT HI MDM: CPT | Mod: 25

## 2022-12-17 PROCEDURE — 83605 ASSAY OF LACTIC ACID: CPT | Performed by: EMERGENCY MEDICINE

## 2022-12-17 PROCEDURE — 93010 EKG 12-LEAD: ICD-10-PCS | Mod: ,,, | Performed by: INTERNAL MEDICINE

## 2022-12-17 PROCEDURE — 82550 ASSAY OF CK (CPK): CPT | Performed by: EMERGENCY MEDICINE

## 2022-12-17 PROCEDURE — U0002 COVID-19 LAB TEST NON-CDC: HCPCS | Performed by: EMERGENCY MEDICINE

## 2022-12-17 PROCEDURE — 63600175 PHARM REV CODE 636 W HCPCS: Performed by: INTERNAL MEDICINE

## 2022-12-17 PROCEDURE — 96376 TX/PRO/DX INJ SAME DRUG ADON: CPT

## 2022-12-17 PROCEDURE — 96365 THER/PROPH/DIAG IV INF INIT: CPT

## 2022-12-17 PROCEDURE — 84484 ASSAY OF TROPONIN QUANT: CPT | Performed by: EMERGENCY MEDICINE

## 2022-12-17 PROCEDURE — 63600175 PHARM REV CODE 636 W HCPCS: Performed by: EMERGENCY MEDICINE

## 2022-12-17 PROCEDURE — 87040 BLOOD CULTURE FOR BACTERIA: CPT | Performed by: EMERGENCY MEDICINE

## 2022-12-17 PROCEDURE — 87502 INFLUENZA DNA AMP PROBE: CPT | Performed by: EMERGENCY MEDICINE

## 2022-12-17 PROCEDURE — 51701 INSERT BLADDER CATHETER: CPT

## 2022-12-17 PROCEDURE — 96375 TX/PRO/DX INJ NEW DRUG ADDON: CPT

## 2022-12-17 PROCEDURE — 25000003 PHARM REV CODE 250: Performed by: EMERGENCY MEDICINE

## 2022-12-17 PROCEDURE — 25500020 PHARM REV CODE 255: Performed by: EMERGENCY MEDICINE

## 2022-12-17 PROCEDURE — 93010 ELECTROCARDIOGRAM REPORT: CPT | Mod: ,,, | Performed by: INTERNAL MEDICINE

## 2022-12-17 PROCEDURE — 83735 ASSAY OF MAGNESIUM: CPT | Performed by: EMERGENCY MEDICINE

## 2022-12-17 PROCEDURE — 84145 PROCALCITONIN (PCT): CPT | Performed by: EMERGENCY MEDICINE

## 2022-12-17 PROCEDURE — 25000003 PHARM REV CODE 250: Performed by: INTERNAL MEDICINE

## 2022-12-17 PROCEDURE — 80053 COMPREHEN METABOLIC PANEL: CPT | Performed by: EMERGENCY MEDICINE

## 2022-12-17 PROCEDURE — 85025 COMPLETE CBC W/AUTO DIFF WBC: CPT | Performed by: EMERGENCY MEDICINE

## 2022-12-17 RX ORDER — ONDANSETRON 2 MG/ML
4 INJECTION INTRAMUSCULAR; INTRAVENOUS EVERY 6 HOURS PRN
Status: DISCONTINUED | OUTPATIENT
Start: 2022-12-17 | End: 2022-12-20 | Stop reason: HOSPADM

## 2022-12-17 RX ORDER — SIMETHICONE 80 MG
1 TABLET,CHEWABLE ORAL 4 TIMES DAILY PRN
Status: DISCONTINUED | OUTPATIENT
Start: 2022-12-17 | End: 2022-12-20 | Stop reason: HOSPADM

## 2022-12-17 RX ORDER — ACETAMINOPHEN 325 MG/1
650 TABLET ORAL EVERY 4 HOURS PRN
Status: DISCONTINUED | OUTPATIENT
Start: 2022-12-17 | End: 2022-12-20 | Stop reason: HOSPADM

## 2022-12-17 RX ORDER — MORPHINE SULFATE 2 MG/ML
2 INJECTION, SOLUTION INTRAMUSCULAR; INTRAVENOUS
Status: COMPLETED | OUTPATIENT
Start: 2022-12-17 | End: 2022-12-17

## 2022-12-17 RX ORDER — HYDROMORPHONE HYDROCHLORIDE 1 MG/ML
0.5 INJECTION, SOLUTION INTRAMUSCULAR; INTRAVENOUS; SUBCUTANEOUS
Status: COMPLETED | OUTPATIENT
Start: 2022-12-17 | End: 2022-12-17

## 2022-12-17 RX ORDER — CYCLOBENZAPRINE HCL 5 MG
5 TABLET ORAL 2 TIMES DAILY PRN
COMMUNITY
Start: 2022-08-10 | End: 2023-08-10

## 2022-12-17 RX ORDER — ONDANSETRON 2 MG/ML
4 INJECTION INTRAMUSCULAR; INTRAVENOUS
Status: COMPLETED | OUTPATIENT
Start: 2022-12-17 | End: 2022-12-17

## 2022-12-17 RX ORDER — MUPIROCIN 20 MG/G
1 OINTMENT TOPICAL 2 TIMES DAILY
COMMUNITY
Start: 2022-05-10

## 2022-12-17 RX ORDER — ASPIRIN 325 MG
325 TABLET ORAL
Status: DISCONTINUED | OUTPATIENT
Start: 2022-12-17 | End: 2022-12-17

## 2022-12-17 RX ORDER — HYDROMORPHONE HYDROCHLORIDE 1 MG/ML
1 INJECTION, SOLUTION INTRAMUSCULAR; INTRAVENOUS; SUBCUTANEOUS
Status: COMPLETED | OUTPATIENT
Start: 2022-12-17 | End: 2022-12-17

## 2022-12-17 RX ORDER — ACETAMINOPHEN 500 MG
1000 TABLET ORAL
Status: COMPLETED | OUTPATIENT
Start: 2022-12-17 | End: 2022-12-17

## 2022-12-17 RX ORDER — INSULIN ASPART 100 [IU]/ML
0-5 INJECTION, SOLUTION INTRAVENOUS; SUBCUTANEOUS
Status: DISCONTINUED | OUTPATIENT
Start: 2022-12-17 | End: 2022-12-20 | Stop reason: HOSPADM

## 2022-12-17 RX ORDER — ONDANSETRON 2 MG/ML
INJECTION INTRAMUSCULAR; INTRAVENOUS
Status: COMPLETED
Start: 2022-12-17 | End: 2022-12-18

## 2022-12-17 RX ORDER — NALOXONE HCL 0.4 MG/ML
0.02 VIAL (ML) INJECTION
Status: DISCONTINUED | OUTPATIENT
Start: 2022-12-17 | End: 2022-12-20 | Stop reason: HOSPADM

## 2022-12-17 RX ORDER — MORPHINE SULFATE 4 MG/ML
4 INJECTION, SOLUTION INTRAMUSCULAR; INTRAVENOUS EVERY 4 HOURS PRN
Status: DISCONTINUED | OUTPATIENT
Start: 2022-12-17 | End: 2022-12-18

## 2022-12-17 RX ORDER — PROCHLORPERAZINE EDISYLATE 5 MG/ML
5 INJECTION INTRAMUSCULAR; INTRAVENOUS EVERY 6 HOURS PRN
Status: DISCONTINUED | OUTPATIENT
Start: 2022-12-17 | End: 2022-12-20 | Stop reason: HOSPADM

## 2022-12-17 RX ORDER — IBUPROFEN 200 MG
24 TABLET ORAL
Status: DISCONTINUED | OUTPATIENT
Start: 2022-12-17 | End: 2022-12-20 | Stop reason: HOSPADM

## 2022-12-17 RX ORDER — SODIUM CHLORIDE 0.9 % (FLUSH) 0.9 %
10 SYRINGE (ML) INJECTION EVERY 6 HOURS PRN
Status: DISCONTINUED | OUTPATIENT
Start: 2022-12-17 | End: 2022-12-20 | Stop reason: HOSPADM

## 2022-12-17 RX ORDER — ACETAMINOPHEN 500 MG
500 TABLET ORAL EVERY 6 HOURS PRN
COMMUNITY

## 2022-12-17 RX ORDER — IBUPROFEN 200 MG
16 TABLET ORAL
Status: DISCONTINUED | OUTPATIENT
Start: 2022-12-17 | End: 2022-12-20 | Stop reason: HOSPADM

## 2022-12-17 RX ORDER — MAG HYDROX/ALUMINUM HYD/SIMETH 200-200-20
30 SUSPENSION, ORAL (FINAL DOSE FORM) ORAL 4 TIMES DAILY PRN
Status: DISCONTINUED | OUTPATIENT
Start: 2022-12-17 | End: 2022-12-20 | Stop reason: HOSPADM

## 2022-12-17 RX ORDER — HYDROCODONE BITARTRATE AND ACETAMINOPHEN 5; 325 MG/1; MG/1
1 TABLET ORAL EVERY 6 HOURS PRN
Status: DISCONTINUED | OUTPATIENT
Start: 2022-12-17 | End: 2022-12-18

## 2022-12-17 RX ORDER — TALC
9 POWDER (GRAM) TOPICAL NIGHTLY PRN
Status: DISCONTINUED | OUTPATIENT
Start: 2022-12-17 | End: 2022-12-20 | Stop reason: HOSPADM

## 2022-12-17 RX ORDER — ACETAMINOPHEN 500 MG
1000 TABLET ORAL EVERY 8 HOURS PRN
Status: DISCONTINUED | OUTPATIENT
Start: 2022-12-17 | End: 2022-12-20 | Stop reason: HOSPADM

## 2022-12-17 RX ORDER — GLUCAGON 1 MG
1 KIT INJECTION
Status: DISCONTINUED | OUTPATIENT
Start: 2022-12-17 | End: 2022-12-20 | Stop reason: HOSPADM

## 2022-12-17 RX ORDER — TRAMADOL HYDROCHLORIDE 50 MG/1
50 TABLET ORAL EVERY 6 HOURS PRN
COMMUNITY
Start: 2022-10-18

## 2022-12-17 RX ORDER — FENTANYL 12.5 UG/1
1 PATCH TRANSDERMAL
Status: DISCONTINUED | OUTPATIENT
Start: 2022-12-17 | End: 2022-12-18

## 2022-12-17 RX ADMIN — MORPHINE SULFATE 2 MG: 2 INJECTION, SOLUTION INTRAMUSCULAR; INTRAVENOUS at 12:12

## 2022-12-17 RX ADMIN — HYDROMORPHONE HYDROCHLORIDE 0.5 MG: 0.5 INJECTION, SOLUTION INTRAMUSCULAR; INTRAVENOUS; SUBCUTANEOUS at 01:12

## 2022-12-17 RX ADMIN — HYDROMORPHONE HYDROCHLORIDE 1 MG: 1 INJECTION, SOLUTION INTRAMUSCULAR; INTRAVENOUS; SUBCUTANEOUS at 03:12

## 2022-12-17 RX ADMIN — Medication 9 MG: at 09:12

## 2022-12-17 RX ADMIN — MORPHINE SULFATE 4 MG: 4 INJECTION, SOLUTION INTRAMUSCULAR; INTRAVENOUS at 08:12

## 2022-12-17 RX ADMIN — ONDANSETRON 4 MG: 2 INJECTION INTRAMUSCULAR; INTRAVENOUS at 12:12

## 2022-12-17 RX ADMIN — IOHEXOL 100 ML: 350 INJECTION, SOLUTION INTRAVENOUS at 04:12

## 2022-12-17 RX ADMIN — HYDROMORPHONE HYDROCHLORIDE 1 MG: 1 INJECTION, SOLUTION INTRAMUSCULAR; INTRAVENOUS; SUBCUTANEOUS at 04:12

## 2022-12-17 RX ADMIN — FENTANYL TRANSDERMAL 1 PATCH: 12.5 PATCH, EXTENDED RELEASE TRANSDERMAL at 09:12

## 2022-12-17 RX ADMIN — ACETAMINOPHEN 1000 MG: 500 TABLET ORAL at 12:12

## 2022-12-17 RX ADMIN — PIPERACILLIN SODIUM AND TAZOBACTAM SODIUM 4.5 G: 4; .5 INJECTION, POWDER, LYOPHILIZED, FOR SOLUTION INTRAVENOUS at 03:12

## 2022-12-17 RX ADMIN — APIXABAN 5 MG: 5 TABLET, FILM COATED ORAL at 09:12

## 2022-12-17 RX ADMIN — ONDANSETRON 4 MG: 2 INJECTION INTRAMUSCULAR; INTRAVENOUS at 03:12

## 2022-12-17 NOTE — ED PROVIDER NOTES
Encounter Date: 12/17/2022       History     Chief Complaint   Patient presents with    Back Pain     Bone Cancer pt     72-year-old female recently diagnosed with metastatic breast cancer, breast cancer is recurrent, Mets to hip and spine who presents complaining of intractable pain to the back.  The pain has progressively worsened over the past 2 weeks.  She has been to see her oncologist.  She states that her medications are not being adjusted by the oncologist.  She is presenting now as she is not able to tolerate the pain.  The pain is located to the left lower back area.  The pain does not radiate.  She has no associated incontinence, urinary retention type symptoms or any lower extremity weakness numbness tingling or paresthesias.  She is not had any fever.  She denies chest pain or shortness of breath.  Her family report that she has a history of DVTs in the past.  Chart review by her recent oncology visit reports that she is taking Eliquis however the patient and her family report to me that they told the oncologist specifically at that time that she has been off Eliquis for while.  Her appetite and activity level have been decreased although she denies any lower extremity weakness or paralysis.  She denies any headache or visual changes.  The pain has been severe in nature, has been at this current level over the past week and is worse with movement and somewhat better with remaining still and somewhat better with taking her oral hydrocodone.  She was scheduled to undergo radiation therapy this week specifically for this pain and discomfort/palliative treatment but did not go to radiation therapy as she states she was in too much pain to go to these appointments..  She denies any other problems or complaints.      Review of patient's allergies indicates:  No Known Allergies  Past Medical History:   Diagnosis Date    Breast cancer     Hypertension     Obese     Thyroid disease     hypothyroidism     Past  Surgical History:   Procedure Laterality Date     SECTION      CHOLECYSTECTOMY      COLON SURGERY      colostomy    HERNIA REPAIR      KIDNEY SURGERY Left 2006    removal     No family history on file.  Social History     Tobacco Use    Smoking status: Never    Smokeless tobacco: Never   Substance Use Topics    Alcohol use: No    Drug use: No     Review of Systems   Constitutional:  Positive for activity change, appetite change, chills and fatigue. Negative for fever.   HENT: Negative.  Negative for congestion, dental problem, ear pain, rhinorrhea, sinus pressure, sinus pain, sore throat and trouble swallowing.    Eyes: Negative.  Negative for photophobia, pain, redness and visual disturbance.   Respiratory: Negative.  Negative for cough, chest tightness, shortness of breath and wheezing.    Cardiovascular: Negative.  Negative for chest pain, palpitations and leg swelling.   Gastrointestinal:  Positive for abdominal pain and nausea. Negative for abdominal distention, anal bleeding, blood in stool, constipation, diarrhea and vomiting.   Endocrine: Negative.    Genitourinary: Negative.  Negative for decreased urine volume, difficulty urinating, dysuria, flank pain, frequency, hematuria, pelvic pain and urgency.   Musculoskeletal:  Positive for arthralgias, back pain and myalgias. Negative for gait problem, joint swelling, neck pain and neck stiffness.   Skin: Negative.  Negative for color change, pallor and rash.   Neurological:  Negative for dizziness, tremors, seizures, syncope, facial asymmetry, speech difficulty, weakness (Nonfocal generalized weakness and fatigue, no focal weakness.), light-headedness, numbness and headaches.   Hematological: Negative.  Does not bruise/bleed easily.   Psychiatric/Behavioral: Negative.  Negative for behavioral problems, confusion, decreased concentration, dysphoric mood and hallucinations. The patient is not nervous/anxious and is not hyperactive.    All other systems  reviewed and are negative.    Physical Exam     Initial Vitals [12/17/22 1223]   BP Pulse Resp Temp SpO2   (!) 167/75 93 20 98.3 °F (36.8 °C) 99 %      MAP       --         Physical Exam    Nursing note and vitals reviewed.  Constitutional: She is not diaphoretic. She is active and cooperative.  Non-toxic appearance. She does not have a sickly appearance. She does not appear ill. No distress.   HENT:   Head: Normocephalic and atraumatic.   Right Ear: Tympanic membrane normal.   Left Ear: Tympanic membrane normal.   Nose: Nose normal.   Mouth/Throat: Uvula is midline, oropharynx is clear and moist and mucous membranes are normal. No oral lesions. No uvula swelling. No oropharyngeal exudate, posterior oropharyngeal edema or posterior oropharyngeal erythema.   Eyes: Conjunctivae, EOM and lids are normal. Pupils are equal, round, and reactive to light. No scleral icterus.   Neck: Trachea normal and phonation normal. Neck supple. No thyroid mass and no thyromegaly present. No stridor present. No JVD present.   Normal range of motion.   Full passive range of motion without pain.     Cardiovascular:  Normal rate, regular rhythm, normal heart sounds, intact distal pulses and normal pulses.     Exam reveals no gallop, no distant heart sounds and no friction rub.       No murmur heard.  Pulmonary/Chest: Effort normal and breath sounds normal. No accessory muscle usage or stridor. No tachypnea. No respiratory distress. She has no wheezes. She has no rhonchi. She has no rales.   Abdominal: Abdomen is soft. Bowel sounds are normal. She exhibits no distension, no pulsatile midline mass and no mass. There is generalized abdominal tenderness.   Ostomy noted to right lower quadrant, thin brown stool present.  No surrounding erythema.  Abdomen with mild diffuse tenderness, no rebound, no guarding, no localized tenderness, abdomen is soft, bowel sounds are normal.   No right CVA tenderness.  No left CVA tenderness. There is no  rigidity, no rebound and no guarding. negative Rovsing's sign  Genitourinary: Rectum:      No rectal mass, anal fissure, tenderness or abnormal anal tone.      Genitourinary Comments: Normal perianal sensation, normal tone,     Musculoskeletal:         General: No edema.      Right hand: Normal. Normal range of motion. Normal strength. Normal sensation. Normal capillary refill. Normal pulse.      Left hand: Normal. Normal range of motion. Normal strength. Normal sensation. Normal capillary refill. Normal pulse.      Cervical back: Normal, full passive range of motion without pain, normal range of motion and neck supple. No edema, erythema, rigidity or bony tenderness. No spinous process tenderness or muscular tenderness. Normal range of motion.      Thoracic back: Normal. No bony tenderness. Normal range of motion.      Lumbar back: Tenderness present. No bony tenderness. Decreased range of motion. Negative right straight leg raise test and negative left straight leg raise test.      Right foot: Normal. Normal range of motion and normal capillary refill. No tenderness or bony tenderness. Normal pulse.      Left foot: Normal. Normal range of motion and normal capillary refill. No tenderness or bony tenderness. Normal pulse.      Comments: Pulses are 2+ throughout, cap refill is less than 2 sec throughout, extremities are nontender throughout with full range of motion. There is no spinal tenderness to palpation.  Mild bilateral lower paraspinal muscular tenderness to palpation.  No midline tenderness to palpation or step-off.  Patient has more pain with range of motion then palpation.     Neurological: She is alert and oriented to person, place, and time. She has normal strength. She displays normal reflexes. No cranial nerve deficit or sensory deficit. Gait normal.   No focal deficits.   Skin: Skin is warm, dry and intact. Capillary refill takes less than 2 seconds. No ecchymosis, no petechiae and no rash noted. No  erythema. No pallor.   Psychiatric: She has a normal mood and affect. Her speech is normal and behavior is normal. Judgment and thought content normal. Cognition and memory are normal.       ED Course   Procedures  Labs Reviewed   COMPREHENSIVE METABOLIC PANEL - Abnormal; Notable for the following components:       Result Value    Glucose 117 (*)     Albumin 3.4 (*)     Total Bilirubin 1.1 (*)     All other components within normal limits   PROCALCITONIN - Abnormal; Notable for the following components:    Procalcitonin 4.13 (*)     All other components within normal limits   CULTURE, BLOOD   LIPASE   CK   MAGNESIUM   TSH   CBC W/ AUTO DIFFERENTIAL   TROPONIN I HIGH SENSITIVITY   TROPONIN I HIGH SENSITIVITY   SARS-COV-2 RNA AMPLIFICATION, QUAL   INFLUENZA A AND B ANTIGEN    Narrative:     Specimen Source->Nasopharyngeal Swab   LACTIC ACID, PLASMA   DRUG SCREEN PANEL, URINE EMERGENCY   URINALYSIS, REFLEX TO URINE CULTURE   B-TYPE NATRIURETIC PEPTIDE   ISTAT CREATININE   POCT CREATININE          Imaging Results              CT Abdomen Pelvis With Contrast (Final result)  Result time 12/17/22 17:08:50      Final result by Yosvany Henson Jr., MD (12/17/22 17:08:50)                   Narrative:    CT ABDOMEN AND PELVIS WITH INTRAVENOUS CONTRAST    CMS MANDATED QUALITY DATA - CT RADIATION  436    All CT scans at this facility utilize dose modulation, iterative reconstruction, and/or weight based dosing when appropriate to reduce radiation dose to as low as reasonably achievable.      HISTORY: Acute abdominal pain. Nonlocalized.    Technical factors:   Spiral imaging of the abdomen and pelvis was performed at 2.0 mm increments pre-and post IV contrast injection following the bolus infusion of 100 cc of Omnipaque 350. Of intravenous contrast. Coronal and sagittal reconstruction images were acquired. No enteric contrast was employed.    FINDINGS:  Lung base images reveal evidence of minimal bibasilar scarring. Soft tissue  density nodule occupies the right lower lobe migrating over the right hemidiaphragm reaching 1.1 cm in size. Mild pericardial fluid and pericardial thickening and fluid with normal-appearing cardiac chambers.    Liver maintains normal side with evidence of diffuse low-density change secondary fibrofatty changes. There are postoperative changes of previous gallbladder resection. Spleen is enlarged with innumerable calcified granulomata.    Postoperative changes of previous gallbladder resection. The stomach is unremarkable in CT appearance. A few benign-appearing nodes in the gastroepiploic spaces. Pancreas maintains normal size and uniform density, small exophytic pancreatic mass along the anterior margin of the body of the pancreas region upper is a 9 mm in size. No obstructive dilatation of the pancreatic duct is noted.    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 Postop changes of previous left nephrectomy.    Aorta maintains normal caliber, markedly calcified, with [place] filter occupying the proximal inferior vena cava.    There is a large parastomal hernia with a large segment mesenteric fat and bowel projecting into the hernia opening with base diameter of 4.6 cm.    Pelvic structures demonstrates no significant finding. Uterus appears heterogeneous in appearance. The bladder is well-distended without evidence of acute pathology. There are dilated pelvic vessels.    There is prominent circumferential thickening of the distal rectum and anus with rind of low-density changes suggestive of proctitis. No evidence of perforation. There is inflammatory stranding projecting near the presacral space.    The osseous structures reveal no evidence of bony destructive changes.    IMPRESSION:  1. Prominent low-density changes throughout the distal rectum encircling the perirectal fat suggestive of proctitis.  2. Fatty liver.  3. Large parastomal hernia with a large segment of  the posterior fat and small bowel projecting into the hernia opening.  3. Fatty liver.  4. Granulomatous calcifications spleen and liver.  5. Status post cholecystectomy.  5. Small low-density mass projecting along the anterior surface of the pancreatic body. Recommend further evaluation with MRI.    Electronically signed by:  Yosvany Henson MD  12/17/2022 5:08 PM CST Workstation: [id number]                                     CTA Chest Non-Coronary (PE Studies) (Final result)  Result time 12/17/22 17:02:51      Final result by Yosvany Henson Jr., MD (12/17/22 17:02:51)                   Narrative:    CMS MANDATED QUALITY DATA-CT RADIATION-436    HISTORY: Pulmonary embolism suspected. High probability..    COMPARISON: 9/28/2021.    TECHNIQUE:: Thin axial imaging through the chest was performed with 100 cc of Omnipaque IV contrast, with sagittal and coronal images, MIPS, and or 3D reconstructions performed. All CT exams at this facility use dose modulation, iterative reconstruction, and or weight based dosing when appropriate to reduce radiation dose to as low as reasonably achievable.    FINDINGS:    Right lower lobe segmental pulmonary artery can establish evidence of a fairly oblong segment of eccentric filling defect segmental artery supplying the right lower lobe affecting the posterior division showing interval change over time that is indicative of a benign web. No evidence of central pulmonary filling the posterior established no evidence of acute pulmonary emboli.    Cardiac chambers maintain normal caliber and overall configuration. Extensive atheromatous plaquing the major coronary vessels. The thoracic aorta maintains normal caliber without evidence of aneurysmal distention.    Parenchymal lung windows settings again demonstrate evidence of regional areas of bibasilar scar or atelectasis show no definable change upon comparison, calcified granulomatous focus in the right lower lobe with pleural  contact measuring 10 mm in size and large uniform soft tissue nodule in the right lower lobe migrating over the right hemidiaphragm showing no definable change upon comparison reaching 10 mm in size. Left lung appears clear.    The central bronchial airway appears patent.    No evidence of significant pericardial or pleural effusion.    No evidence of enlarged mediastinal or hilar lymph nodes.    The upper abdominal cavity reveals postoperative changes of previous gallbladder resection. There is mild splenomegaly with innumerable calcified granulomata throughout the spleen.    IMPRESSION:  1. No CT evidence of acute pulmonary thromboembolism. Eccentric focus along the segmental zone of the right lower lobe segmental pulmonary artery is in keeping with a benign pulmonary webbing.  2. Dense granulous calcification the peripheral right lower lobe.  3. Well-defined dense soft tissue nodule migrating over the right hemidiaphragm show no evidence of definable change upon comparison.  3. Postoperative changes of previous gallbladder resection.    Electronically signed by:  Yosvany Henson MD  12/17/2022 5:02 PM CST Workstation: [id number]                                     US Lower Extremity Veins Bilateral (Final result)  Result time 12/17/22 15:01:59      Final result by Yosvany Henson Jr., MD (12/17/22 15:01:59)                   Narrative:    Examination: Bilateral thoracic extremity venous Doppler    CLINICAL HISTORY: Bilateral extremity pain.    Technical factors: Real-time establish the bilateral extremity venous system was employed utilizing both gray scale and color flow imaging within appropriate transducer with adequate penetrability.    COMPARISON: Correlation is made with patient's previous imaging study of 9/20/2021.    FINDINGS:    On the resting, there is evidence of spontaneous flow involving the right greater saphenous, superficial femoral vein with continued flow within the deep femoral and proximal  superficial femoral vein, however there is absence of any reproducible coalescent established within the distal SMV due to occlusive change stable upon comparison. Absent color flow signal establish the popliteal vein. No reproducible flow within the posterior tibial vein and right peroneal vein shows nominal color flow signal. Anterior tibial vein patent.    On left side, there is evidence of established low within the left lower extremity venous system from the level of the common femoral vein through the level of the anterior tibial veins. There is recanalization of the regions of occlusive changes noted from previous.    IMPRESSION:  1. Chronic occlusive thrombus involving the right superficial femoral vein both proximal and distally no reproducible recanalization on the current study. The popliteal vein now shows no reproducible flow. Posterior tibial vein likewise occluded.  2. Clearance of the clot formation established in the left lower extremity venous system with no evidence of residual clot formation.    Electronically signed by:  Yosvany Henson MD  12/17/2022 3:01 PM CST Workstation: [id number]                                     X-Ray Chest AP Portable (Final result)  Result time 12/17/22 13:41:31      Final result by Yosvany Henson Jr., MD (12/17/22 13:41:31)                   Narrative:    CHEST X-RAY SINGLE VIEW    HISTORY: Chest pain and back pain.    FINDINGS:   A single view of the chest was performed without the benefit of previous comparison.  The heart size and pulmonary vascularity are within the range of normal.  There is no significant hilar nor mediastinal process.  The aerated lungs are well expanded and clear.  The right and left CP angles are rather sharp.  The osseous structures show nothing unusual.    IMPRESSION:   Negative chest.    Electronically signed by:  Yosvany Henson MD  12/17/2022 1:41 PM CST Workstation: [id number]                                     Medications    ondansetron 4 mg/2 mL injection (  Canceled Entry 12/17/22 1300)   apixaban tablet 5 mg (5 mg Oral Given 12/17/22 2132)   levothyroxine tablet 125 mcg (has no administration in time range)   sodium chloride 0.9% flush 10 mL (has no administration in time range)   melatonin tablet 9 mg (9 mg Oral Given 12/17/22 2131)   ondansetron injection 4 mg (has no administration in time range)   prochlorperazine injection Soln 5 mg (has no administration in time range)   acetaminophen tablet 1,000 mg (has no administration in time range)   simethicone chewable tablet 80 mg (has no administration in time range)   aluminum-magnesium hydroxide-simethicone 200-200-20 mg/5 mL suspension 30 mL (has no administration in time range)   acetaminophen tablet 650 mg (has no administration in time range)   HYDROcodone-acetaminophen 5-325 mg per tablet 1 tablet (has no administration in time range)   morphine injection 4 mg (4 mg Intravenous Given 12/17/22 2041)   naloxone 0.4 mg/mL injection 0.02 mg (has no administration in time range)   insulin aspart U-100 pen 0-5 Units (has no administration in time range)   glucose chewable tablet 16 g (has no administration in time range)   glucose chewable tablet 24 g (has no administration in time range)   glucagon (human recombinant) injection 1 mg (has no administration in time range)   dextrose 10% bolus 125 mL 125 mL (has no administration in time range)   dextrose 10% bolus 250 mL 250 mL (has no administration in time range)   fentaNYL 12 mcg/hr 1 patch (1 patch Transdermal Given 12/17/22 2131)   acetaminophen tablet 1,000 mg (1,000 mg Oral Given 12/17/22 1253)   morphine injection 2 mg (2 mg Intravenous Given 12/17/22 1253)   ondansetron injection 4 mg (4 mg Intravenous Given 12/17/22 1257)   HYDROmorphone injection 0.5 mg (0.5 mg Intravenous Given 12/17/22 1353)   HYDROmorphone injection 1 mg (1 mg Intravenous Given 12/17/22 1512)   ondansetron injection 4 mg (4 mg Intravenous Given  12/17/22 1512)   piperacillin-tazobactam 4.5 g in dextrose 5 % 100 mL IVPB (ready to mix system) (0 g Intravenous Stopped 12/17/22 1609)   HYDROmorphone injection 1 mg (1 mg Intravenous Given 12/17/22 1610)   iohexoL (OMNIPAQUE 350) injection 100 mL (100 mLs Intravenous Given 12/17/22 1627)     Medical Decision Making:   Clinical Tests:   Lab Tests: Reviewed  Radiological Study: Reviewed  Medical Tests: Reviewed  ED Management:  The patient is currently hemodynamically stable.  After several doses of IV medication she is comfortable at rest although does have significant pain with movement.  Neurological exam is normal.  She does not have any red flag signs currently involving her pain.  She is too uncomfortable to undergo MRI from the emergency room. It was very difficult to obtain the CT scan and she could only stay in the machine for a short period of time, dedicated CT of the thoracic and lumbar spine were not able to be obtained as the patient was not able to lie down for the length of time for this however bony reconstructions did not show any acute aggressive osseous lesions as per radiology reading.  As patient has required multiple doses of parenteral pain medication with marked improvement however not complete relief of pain,                        Clinical Impression:   Final diagnoses:  [R07.9] Chest pain  [Z86.718] History of DVT (deep vein thrombosis)  [R52] Intractable pain (Primary)  [C50.919] Metastatic breast cancer  [C79.51] Bony metastasis        ED Disposition Condition    Observation                 Blanca Loredo MD  12/17/22 5630

## 2022-12-18 LAB
ANION GAP SERPL CALC-SCNC: 10 MMOL/L (ref 8–16)
BASOPHILS # BLD AUTO: 0.03 K/UL (ref 0–0.2)
BASOPHILS NFR BLD: 0.4 % (ref 0–1.9)
BUN SERPL-MCNC: 7 MG/DL (ref 8–23)
CALCIUM SERPL-MCNC: 9.1 MG/DL (ref 8.7–10.5)
CHLORIDE SERPL-SCNC: 100 MMOL/L (ref 95–110)
CO2 SERPL-SCNC: 27 MMOL/L (ref 23–29)
CREAT SERPL-MCNC: 0.7 MG/DL (ref 0.5–1.4)
CRP SERPL-MCNC: 13.01 MG/DL
DIFFERENTIAL METHOD: ABNORMAL
EOSINOPHIL # BLD AUTO: 0.1 K/UL (ref 0–0.5)
EOSINOPHIL NFR BLD: 1.1 % (ref 0–8)
ERYTHROCYTE [DISTWIDTH] IN BLOOD BY AUTOMATED COUNT: 13.5 % (ref 11.5–14.5)
EST. GFR  (NO RACE VARIABLE): >60 ML/MIN/1.73 M^2
GLUCOSE SERPL-MCNC: 103 MG/DL (ref 70–110)
GLUCOSE SERPL-MCNC: 113 MG/DL (ref 70–110)
HCT VFR BLD AUTO: 40.9 % (ref 37–48.5)
HGB BLD-MCNC: 13 G/DL (ref 12–16)
IMM GRANULOCYTES # BLD AUTO: 0.05 K/UL (ref 0–0.04)
IMM GRANULOCYTES NFR BLD AUTO: 0.6 % (ref 0–0.5)
LYMPHOCYTES # BLD AUTO: 1.2 K/UL (ref 1–4.8)
LYMPHOCYTES NFR BLD: 15.3 % (ref 18–48)
MAGNESIUM SERPL-MCNC: 1.7 MG/DL (ref 1.6–2.6)
MCH RBC QN AUTO: 30.7 PG (ref 27–31)
MCHC RBC AUTO-ENTMCNC: 31.8 G/DL (ref 32–36)
MCV RBC AUTO: 97 FL (ref 82–98)
MONOCYTES # BLD AUTO: 0.7 K/UL (ref 0.3–1)
MONOCYTES NFR BLD: 8.3 % (ref 4–15)
NEUTROPHILS # BLD AUTO: 5.9 K/UL (ref 1.8–7.7)
NEUTROPHILS NFR BLD: 74.3 % (ref 38–73)
NRBC BLD-RTO: 0 /100 WBC
PLATELET # BLD AUTO: 150 K/UL (ref 150–450)
PMV BLD AUTO: 9.3 FL (ref 9.2–12.9)
POTASSIUM SERPL-SCNC: 4.1 MMOL/L (ref 3.5–5.1)
RBC # BLD AUTO: 4.24 M/UL (ref 4–5.4)
SODIUM SERPL-SCNC: 137 MMOL/L (ref 136–145)
WBC # BLD AUTO: 7.97 K/UL (ref 3.9–12.7)

## 2022-12-18 PROCEDURE — 83735 ASSAY OF MAGNESIUM: CPT | Performed by: INTERNAL MEDICINE

## 2022-12-18 PROCEDURE — 63600175 PHARM REV CODE 636 W HCPCS

## 2022-12-18 PROCEDURE — 80048 BASIC METABOLIC PNL TOTAL CA: CPT | Performed by: INTERNAL MEDICINE

## 2022-12-18 PROCEDURE — 63600175 PHARM REV CODE 636 W HCPCS: Performed by: INTERNAL MEDICINE

## 2022-12-18 PROCEDURE — 96366 THER/PROPH/DIAG IV INF ADDON: CPT

## 2022-12-18 PROCEDURE — 86140 C-REACTIVE PROTEIN: CPT | Performed by: INTERNAL MEDICINE

## 2022-12-18 PROCEDURE — G0378 HOSPITAL OBSERVATION PER HR: HCPCS

## 2022-12-18 PROCEDURE — 36415 COLL VENOUS BLD VENIPUNCTURE: CPT | Performed by: INTERNAL MEDICINE

## 2022-12-18 PROCEDURE — 96375 TX/PRO/DX INJ NEW DRUG ADDON: CPT

## 2022-12-18 PROCEDURE — 25000003 PHARM REV CODE 250: Performed by: INTERNAL MEDICINE

## 2022-12-18 PROCEDURE — 63600175 PHARM REV CODE 636 W HCPCS: Performed by: HOSPITALIST

## 2022-12-18 PROCEDURE — 96376 TX/PRO/DX INJ SAME DRUG ADON: CPT

## 2022-12-18 PROCEDURE — 85025 COMPLETE CBC W/AUTO DIFF WBC: CPT | Performed by: INTERNAL MEDICINE

## 2022-12-18 PROCEDURE — 25000003 PHARM REV CODE 250: Performed by: HOSPITALIST

## 2022-12-18 RX ORDER — LANOLIN ALCOHOL/MO/W.PET/CERES
800 CREAM (GRAM) TOPICAL 2 TIMES DAILY
Status: DISCONTINUED | OUTPATIENT
Start: 2022-12-18 | End: 2022-12-20 | Stop reason: HOSPADM

## 2022-12-18 RX ORDER — HYDROCODONE BITARTRATE AND ACETAMINOPHEN 5; 325 MG/1; MG/1
2 TABLET ORAL EVERY 6 HOURS PRN
Status: DISCONTINUED | OUTPATIENT
Start: 2022-12-18 | End: 2022-12-19

## 2022-12-18 RX ORDER — POLYETHYLENE GLYCOL 3350 17 G/17G
17 POWDER, FOR SOLUTION ORAL 2 TIMES DAILY
Status: DISCONTINUED | OUTPATIENT
Start: 2022-12-18 | End: 2022-12-20 | Stop reason: HOSPADM

## 2022-12-18 RX ORDER — MORPHINE SULFATE 4 MG/ML
4 INJECTION, SOLUTION INTRAMUSCULAR; INTRAVENOUS
Status: DISCONTINUED | OUTPATIENT
Start: 2022-12-18 | End: 2022-12-19

## 2022-12-18 RX ORDER — FENTANYL 25 UG/1
1 PATCH TRANSDERMAL
Status: DISCONTINUED | OUTPATIENT
Start: 2022-12-18 | End: 2022-12-20 | Stop reason: HOSPADM

## 2022-12-18 RX ADMIN — MAGNESIUM OXIDE 400 MG (241.3 MG MAGNESIUM) TABLET 800 MG: at 09:12

## 2022-12-18 RX ADMIN — HYDROCODONE BITARTRATE AND ACETAMINOPHEN 2 TABLET: 5; 325 TABLET ORAL at 09:12

## 2022-12-18 RX ADMIN — POLYETHYLENE GLYCOL 3350 17 G: 17 POWDER, FOR SOLUTION ORAL at 02:12

## 2022-12-18 RX ADMIN — FENTANYL TRANSDERMAL 1 PATCH: 25 PATCH, EXTENDED RELEASE TRANSDERMAL at 05:12

## 2022-12-18 RX ADMIN — MAGNESIUM OXIDE 400 MG (241.3 MG MAGNESIUM) TABLET 800 MG: at 02:12

## 2022-12-18 RX ADMIN — MORPHINE SULFATE 4 MG: 4 INJECTION, SOLUTION INTRAMUSCULAR; INTRAVENOUS at 01:12

## 2022-12-18 RX ADMIN — MORPHINE SULFATE 4 MG: 4 INJECTION, SOLUTION INTRAMUSCULAR; INTRAVENOUS at 06:12

## 2022-12-18 RX ADMIN — APIXABAN 5 MG: 5 TABLET, FILM COATED ORAL at 09:12

## 2022-12-18 RX ADMIN — LEVOTHYROXINE SODIUM 125 MCG: 0.1 TABLET ORAL at 05:12

## 2022-12-18 RX ADMIN — PROCHLORPERAZINE EDISYLATE 5 MG: 5 INJECTION INTRAMUSCULAR; INTRAVENOUS at 06:12

## 2022-12-18 RX ADMIN — HYDROCODONE BITARTRATE AND ACETAMINOPHEN 1 TABLET: 5; 325 TABLET ORAL at 12:12

## 2022-12-18 RX ADMIN — ONDANSETRON 4 MG: 2 INJECTION INTRAMUSCULAR; INTRAVENOUS at 01:12

## 2022-12-18 RX ADMIN — HYDROCODONE BITARTRATE AND ACETAMINOPHEN 1 TABLET: 5; 325 TABLET ORAL at 03:12

## 2022-12-18 RX ADMIN — POLYETHYLENE GLYCOL 3350 17 G: 17 POWDER, FOR SOLUTION ORAL at 09:12

## 2022-12-18 RX ADMIN — MORPHINE SULFATE 4 MG: 4 INJECTION, SOLUTION INTRAMUSCULAR; INTRAVENOUS at 05:12

## 2022-12-18 RX ADMIN — PIPERACILLIN SODIUM AND TAZOBACTAM SODIUM 3.38 G: 3; .375 INJECTION, POWDER, LYOPHILIZED, FOR SOLUTION INTRAVENOUS at 02:12

## 2022-12-18 NOTE — PLAN OF CARE
Completed via phone with Katherine / daughter [phone]       12/18/22 2674   WALKER Message   Medicare Outpatient and Observation Notification regarding financial responsibility Given to patient/caregiver;Explained to patient/caregiver;Signed/date by patient/caregiver   Date WALKER was signed 12/18/22   Time WALKER was signed [id number]

## 2022-12-18 NOTE — H&P
[facility] Medicine History & Physical Examination   Patient Name: Leyda Bravo  MRN: [id number]  Patient Class: OP- Observation   Admission Date: 2022 12:17 PM  Length of Stay: 0  Attending Physician: Haydee Bowers MD  Primary Care Provider: DANGELO Azul  Face-to-Face encounter date: 2022  Code Status: Full Code  Chief Complaint: Back Pain (Bone Cancer pt)        HISTORY OF PRESENT ILLNESS:   Leyda Bravo is a 72 y.o. White female with known history of metastatic breast cancer with mets to the spine presented to the ER for severe back pain. It started two weeks and progressively getting worst. She has no associated incontinence, urinary retention type symptoms or any lower extremity weakness numbness tingling or paresthesias. With these symptoms, she presented to the ER for evaluation. She was given Dilaudid x 3 with no improvement. CT scan abdomen proctitis and mass projecting along the anterior surface of the pancreatic body. Duplex lower extremities showed chronic occlusive thrombus involving the right superficial femoral vein both proximal and distally no reproducible recanalization on the current study. The popliteal vein now shows no reproducible flow. Posterior tibial vein likewise occluded. Hospital medicine consulted for admission.   REVIEW OF SYSTEMS:   10 Point Review of System was performed and was found to be negative except for that mentioned already in the HPI above.     PAST MEDICAL HISTORY:     Past Medical History:   Diagnosis Date    Breast cancer     Hypertension     Obese     Thyroid disease     hypothyroidism       PAST SURGICAL HISTORY:     Past Surgical History:   Procedure Laterality Date     SECTION      CHOLECYSTECTOMY      COLON SURGERY      colostomy    HERNIA REPAIR      KIDNEY SURGERY Left 2006    removal       ALLERGIES:   Patient has no known allergies.    FAMILY HISTORY:   Reviewed but not pertinent   SOCIAL HISTORY:      Social History     Tobacco Use    Smoking status: Never    Smokeless tobacco: Never   Substance Use Topics    Alcohol use: No        Social History     Substance and Sexual Activity   Sexual Activity Not on file        HOME MEDICATIONS:     Prior to Admission medications    Medication Sig Start Date End Date Taking? Authorizing Provider   acetaminophen (TYLENOL) 500 MG tablet Take 500 mg by mouth every 6 (six) hours as needed.   Yes Historical Provider   apixaban (ELIQUIS) 5 mg Tab Take 5 mg by mouth 2 (two) times daily. 1/7/22  Yes Historical Provider   HYDROcodone-acetaminophen (NORCO)  mg per tablet Take 1 tablet by mouth every 6 (six) hours as needed for Pain. 12/15/22  Yes JUDSON Jacob MD   levothyroxine (SYNTHROID) 125 MCG tablet Take 1 tablet (125 mcg total) by mouth once daily. 11/28/22 11/28/23 Yes JUDSON Jacob MD   mupirocin (BACTROBAN) 2 % ointment Apply 1 g topically 2 (two) times daily. 5/10/22  Yes Historical Provider   traMADoL (ULTRAM) 50 mg tablet Take 50 mg by mouth every 6 (six) hours as needed. 10/18/22  Yes Historical Provider   atenolol (TENORMIN) 25 MG tablet Take 1 tablet (25 mg total) by mouth once daily.  Patient not taking: Reported on 12/17/2022 7/22/16 10/7/21  Michael Donovan MD   cyclobenzaprine (FLEXERIL) 5 MG tablet Take 5 mg by mouth 2 (two) times daily as needed. 8/10/22 8/10/23  Historical Provider   HYDROcodone-acetaminophen (NORCO) 7.5-325 mg per tablet Take 1 tablet by mouth every 6 (six) hours as needed for Pain. 11/28/22 12/28/22  JUDSON Jacob MD   letrozole (FEMARA) 2.5 mg Tab Take 1 tablet (2.5 mg total) by mouth once daily.  Patient not taking: Reported on 12/17/2022. 12/7/22 12/7/23  JUDSON Jacob MD   NAPROXEN SODIUM (ALEVE ORAL) Take 1 tablet by mouth 2 (two) times a day.    Historical Provider   ondansetron (ZOFRAN) 8 MG tablet Take 1 tablet (8 mg total) by mouth every 8 (eight) hours as needed for Nausea. 12/8/22 12/8/23  Gabriela Gamble NP  "  ribociclib (KISQALI) 600 mg/day (200 mg x 3) Tab Take 3 tablets (600 mg total) by mouth once daily for 3 weeks (21 days), take 1 week (7 days) off, repeat every 28 days  Patient not taking: Reported on 12/17/2022. 12/7/22   JUDSON Jacob MD         PHYSICAL EXAM:   BP (!) 130/57   Pulse 95   Temp 98.3 °F (36.8 °C) (Oral)   Resp 18   Ht 5' 2" (1.575 m)   Wt 97.1 kg (214 lb)   SpO2 100%   BMI 39.14 kg/m²   Vitals Reviewed  General appearance: acutely ill-appearingWhite female in no apparent distress.  Skin: No Rash.   Neuro: Motor and sensory exams grossly intact. Tenderness on the back in thoracic and lumbar region  HENT: Atraumatic head. Moist mucous membranes of oral cavity.  Eyes: Normal extraocular movements.   Neck: Supple. No evidence of lymphadenopathy. No thyroidomegaly.  Lungs: Clear to auscultation bilaterally. No wheezing present.   Heart: Regular rate and rhythm. S1 and S2 present with no murmurs/gallop/rub.   Abdomen: Soft, non-distended, non-tender. No rebound tenderness/guarding. No masses or organomegaly. Bowel sounds are normal. Bladder is not palpable.   Extremities: No cyanosis, clubbing.  Psych/mental status: Alert and oriented.   EMERGENCY DEPARTMENT LABS AND IMAGING:     Labs Reviewed   COMPREHENSIVE METABOLIC PANEL - Abnormal; Notable for the following components:       Result Value    Glucose 117 (*)     Albumin 3.4 (*)     Total Bilirubin 1.1 (*)     All other components within normal limits   PROCALCITONIN - Abnormal; Notable for the following components:    Procalcitonin 4.13 (*)     All other components within normal limits   CULTURE, BLOOD   CULTURE, BLOOD   LIPASE   CK   MAGNESIUM   TSH   CBC W/ AUTO DIFFERENTIAL   TROPONIN I HIGH SENSITIVITY   TROPONIN I HIGH SENSITIVITY   SARS-COV-2 RNA AMPLIFICATION, QUAL   INFLUENZA A AND B ANTIGEN    Narrative:     Specimen Source->Nasopharyngeal Swab   LACTIC ACID, PLASMA   DRUG SCREEN PANEL, URINE EMERGENCY   URINALYSIS, REFLEX TO URINE " CULTURE   B-TYPE NATRIURETIC PEPTIDE   ISTAT CREATININE   POCT CREATININE       CT Abdomen Pelvis With Contrast   Final Result      CTA Chest Non-Coronary (PE Studies)   Final Result      US Lower Extremity Veins Bilateral   Final Result      X-Ray Chest AP Portable   Final Result          ASSESSMENT & PLAN:   Leyda Bravo is a 72 y.o. female admitted for    Active Hospital Problems    Diagnosis    *Intractable back pain    Malignant neoplasm of breast (female)    DVT (deep venous thrombosis)    Obesity, Class III, BMI 40-49.9 (morbid obesity)        Plan  Admit to med-Surg  IV morphine and PO Norco  Fentanyl patch 12 mcg applied  Consult heme/onc  Palliative care consultation  Resume home DOAC  Resume home medications  Ideally hospice candidate     Diet: Cardiac    DVT Prophylaxis:  DOAC  and Encourage ambulation. OOB as tolerated.     Discharge Planning and Disposition:  Home with assistance from family    Expected LOS: 1-2 days    ________________________________________________________________________________    Face-to-Face encounter date: 12/17/2022  Encounter included review of the medical records, interviewing and examining the patient face-to-face, discussion with family and other health care providers including emergency medicine physician, admission orders, interpreting lab/test results and formulating a plan of care.   Medical Decision Making during this encounter was High Complexity due to Patient is currently receiving parenteral controlled substances: Dilaudid and morphine and Fentanyl   ________________________________________________________________________________    INPATIENT LIST OF MEDICATIONS     Current Facility-Administered Medications:     fentaNYL 12 mcg/hr 1 patch, 1 patch, Transdermal, Q72H, Haydee Bowers MD    ondansetron 4 mg/2 mL injection, , , ,     Current Outpatient Medications:     acetaminophen (TYLENOL) 500 MG tablet, Take 500 mg by mouth every 6 (six) hours as needed.,  Disp: , Rfl:     apixaban (ELIQUIS) 5 mg Tab, Take 5 mg by mouth 2 (two) times daily., Disp: , Rfl:     HYDROcodone-acetaminophen (NORCO)  mg per tablet, Take 1 tablet by mouth every 6 (six) hours as needed for Pain., Disp: 120 tablet, Rfl: 0    levothyroxine (SYNTHROID) 125 MCG tablet, Take 1 tablet (125 mcg total) by mouth once daily., Disp: 30 tablet, Rfl: 11    mupirocin (BACTROBAN) 2 % ointment, Apply 1 g topically 2 (two) times daily., Disp: , Rfl:     traMADoL (ULTRAM) 50 mg tablet, Take 50 mg by mouth every 6 (six) hours as needed., Disp: , Rfl:     atenolol (TENORMIN) 25 MG tablet, Take 1 tablet (25 mg total) by mouth once daily. (Patient not taking: Reported on 12/17/2022), Disp: 30 tablet, Rfl: 11    cyclobenzaprine (FLEXERIL) 5 MG tablet, Take 5 mg by mouth 2 (two) times daily as needed., Disp: , Rfl:     HYDROcodone-acetaminophen (NORCO) 7.5-325 mg per tablet, Take 1 tablet by mouth every 6 (six) hours as needed for Pain., Disp: 120 tablet, Rfl: 0    letrozole (FEMARA) 2.5 mg Tab, Take 1 tablet (2.5 mg total) by mouth once daily. (Patient not taking: Reported on 12/17/2022.), Disp: 30 tablet, Rfl: 6    NAPROXEN SODIUM (ALEVE ORAL), Take 1 tablet by mouth 2 (two) times a day., Disp: , Rfl:     ondansetron (ZOFRAN) 8 MG tablet, Take 1 tablet (8 mg total) by mouth every 8 (eight) hours as needed for Nausea., Disp: 30 tablet, Rfl: 2    ribociclib (KISQALI) 600 mg/day (200 mg x 3) Tab, Take 3 tablets (600 mg total) by mouth once daily for 3 weeks (21 days), take 1 week (7 days) off, repeat every 28 days (Patient not taking: Reported on 12/17/2022.), Disp: 63 tablet, Rfl: 6      Scheduled Meds:   fentaNYL  1 patch Transdermal Q72H    ondansetron         Continuous Infusions:  PRN Meds:.      Haydee Bowers  [facility] Hospitalist  12/17/2022

## 2022-12-18 NOTE — PLAN OF CARE
[facility]  Initial Discharge Assessment       Primary Care Provider: DANGELO Azul    Admission Diagnosis: Intractable pain [R52]    Admission Date: 12/17/2022  Expected Discharge Date: 12/19/2022    Assessment and WALKER completed via phone with daughter / Katherine [phone]. Daughter agreeable to hospice discharge recommendation and requested time to speak with her sister and patient to elect a hospice company. Patient will be returning to home she shares with Katherine to receive in home hospice services. Case management to follow.         Payor: MEDICARE / Plan: MEDICARE PART A & B / Product Type: Government /     Extended Emergency Contact Information  Primary Emergency Contact: Katherine Tinajero  Mobile Phone: [phone]  Relation: Daughter  Preferred language: English   needed? No  Secondary Emergency Contact: KirstyNatalie hernandez   [place]  Mobile Phone: [phone]  Relation: Daughter    Discharge Plan A: Hospice/home  Discharge Plan B: Hospice/home      [id number] - [place], [place] - [address] AT [facility] [address]  [address]   [place] [address]  Phone: [phone] Fax: [phone]    [facility] [place] - [address] [place]  [address] [place]  [place] [address]  Phone: [phone] Fax: [phone]      Initial Assessment (most recent)       Adult Discharge Assessment - 12/18/22 1604          Discharge Assessment    Assessment Type Discharge Planning Assessment     Confirmed/corrected address, phone number and insurance Yes     Confirmed Demographics Correct on Facesheet     Source of Information family     Does patient/caregiver understand observation status Yes     Reason For Admission Intractable back pain     People in Home child(dmitri), adult     Facility Arrived From: home     Do you expect to return to your current living situation? Yes     Do you have help at home or someone to help you manage your care  at home? Yes     Who are your caregiver(s) and their phone number(s)? daughter / Katherine [phone]     Prior to hospitilization cognitive status: Unable to Assess     Current cognitive status: Unable to Assess     Discharge Plan A Hospice/home     Discharge Plan B Hospice/home

## 2022-12-18 NOTE — PLAN OF CARE
Problem: Infection  Goal: Absence of Infection Signs and Symptoms  Outcome: Ongoing, Progressing     Problem: Adult Inpatient Plan of Care  Goal: Plan of Care Review  Outcome: Ongoing, Progressing  Goal: Patient-Specific Goal (Individualized)  Outcome: Ongoing, Progressing  Goal: Absence of Hospital-Acquired Illness or Injury  Outcome: Ongoing, Progressing  Goal: Optimal Comfort and Wellbeing  Outcome: Ongoing, Progressing  Goal: Readiness for Transition of Care  Outcome: Ongoing, Progressing     Problem: Coping Ineffective  Goal: Effective Coping  Outcome: Ongoing, Progressing     Problem: Skin Injury Risk Increased  Goal: Skin Health and Integrity  Outcome: Ongoing, Progressing

## 2022-12-18 NOTE — PROGRESS NOTES
[facility] Medicine  Progress Note    Patient name: Leyda Bravo  MRN: [id number]  Admit Date: 12/17/2022   LOS: 0 days     SUBJECTIVE:     Principal problem: Intractable back pain    Interval History:  Patient was seen and examined bedside, she is moaning in pain, appears miserable.  Daughter and other family member is present at bedside very emotional.  No acute events overnight as per nursing staff, labs look stable.  Pain is poorly controlled    Scheduled Meds:   apixaban  5 mg Oral BID    fentaNYL  1 patch Transdermal Q72H    levothyroxine  125 mcg Oral Daily     Continuous Infusions:  PRN Meds:acetaminophen, acetaminophen, aluminum-magnesium hydroxide-simethicone, dextrose 10%, dextrose 10%, glucagon (human recombinant), glucose, glucose, HYDROcodone-acetaminophen, insulin aspart U-100, melatonin, morphine, naloxone, ondansetron, prochlorperazine, simethicone, sodium chloride 0.9%    Review of patient's allergies indicates:  No Known Allergies    Review of Systems: As per interval history    OBJECTIVE:     Vital Signs (Most Recent)  Temp: 98.5 °F (36.9 °C) (12/18/22 1112)  Pulse: 91 (12/18/22 1112)  Resp: 18 (12/18/22 1205)  BP: 137/72 (12/18/22 1112)  SpO2: 100 % (12/18/22 1112)    Vital Signs Range (Last 24H):  Temp:  [97.7 °F (36.5 °C)-98.5 °F (36.9 °C)]   Pulse:  [78-95]   Resp:  [17-20]   BP: (130-186)/(57-86)   SpO2:  [96 %-100 %]     I & O (Last 24H):  Intake/Output Summary (Last 24 hours) at 12/18/2022 1212  Last data filed at 12/17/2022 1609  Gross per 24 hour   Intake 100 ml   Output --   Net 100 ml       Physical Exam:  General:  Chronically ill lady, moderate distress due to pain  Eyes: No conjunctival injection. No scleral icterus.  ENT: Hearing grossly intact. No discharge from ears. No nasal discharge.   Neck: Supple, trachea midline. No JVD  CVS: RRR. No LE edema BL  Lungs:  No tachypnea or accessory muscle use.  Clear to auscultation bilaterally  Abdomen:  Soft,  nontender and nondistended.  No organomegaly  Neuro: AOx3. Moves all extremities. Follows commands. Responds appropriately       Laboratory:  I have reviewed all pertinent lab results within the past 24 hours.    Diagnostic Results:  Reviewed all imaging    ASSESSMENT/PLAN:     Very unfortunate 72-year-old lady with known history of metastatic breast cancer with metastasis to spine, neck came with intractable back pain, lower abdominal and pelvic pain.  Very unfortunate case    Active Hospital Problems    Diagnosis  POA    *Intractable back pain [M54.9]  Yes    Malignant neoplasm of breast (female) [C50.919]  Yes    DVT (deep venous thrombosis) [I82.409]  Yes    Obesity, Class III, BMI 40-49.9 (morbid obesity) [E66.01]  Yes      Resolved Hospital Problems   No resolved problems to display.         Plan:   Generous pain control-up titrated regimen with fentanyl patch, p.r.n. Norco and p.r.n. IV morphine  Aggressive bowel regimen  Consult Heme-Onc  Consulted palliative Care  Continue Eliquis as improvement in prior DVT  Continue essential home medications  Consult  for home hospice arrangement  Discontinue telemetry monitor  Discontinue daily labs  Poor prognosis    Upon addressing code status and goals of care, patient and family clearly expressed to change code status to DNR.  Patient and family are interested in pursuing hospice at home.         VTE Risk Mitigation (From admission, onward)           Ordered     apixaban tablet 5 mg  2 times daily         12/17/22 1943     IP VTE HIGH RISK PATIENT  Once         12/17/22 1943     Place sequential compression device  Until discontinued         12/17/22 1943     Reason for No Pharmacological VTE Prophylaxis  Once        Question:  Reasons:  Answer:  Already adequately anticoagulated on oral Anticoagulants    12/17/22 1943                        Department Hospital Medicine  [facility] hospital  Chinedu Kline MD  Date of service: 12/18/2022

## 2022-12-19 PROCEDURE — 99223 PR INITIAL HOSPITAL CARE,LEVL III: ICD-10-PCS | Mod: ,,, | Performed by: INTERNAL MEDICINE

## 2022-12-19 PROCEDURE — 12000002 HC ACUTE/MED SURGE SEMI-PRIVATE ROOM

## 2022-12-19 PROCEDURE — 63600175 PHARM REV CODE 636 W HCPCS: Performed by: INTERNAL MEDICINE

## 2022-12-19 PROCEDURE — 96376 TX/PRO/DX INJ SAME DRUG ADON: CPT

## 2022-12-19 PROCEDURE — 99223 1ST HOSP IP/OBS HIGH 75: CPT | Mod: ,,, | Performed by: INTERNAL MEDICINE

## 2022-12-19 PROCEDURE — 25000003 PHARM REV CODE 250: Performed by: HOSPITALIST

## 2022-12-19 PROCEDURE — 25000003 PHARM REV CODE 250: Performed by: INTERNAL MEDICINE

## 2022-12-19 PROCEDURE — 63600175 PHARM REV CODE 636 W HCPCS: Performed by: HOSPITALIST

## 2022-12-19 RX ORDER — MORPHINE SULFATE 2 MG/ML
5 INJECTION, SOLUTION INTRAMUSCULAR; INTRAVENOUS
Status: DISCONTINUED | OUTPATIENT
Start: 2022-12-19 | End: 2022-12-20 | Stop reason: HOSPADM

## 2022-12-19 RX ORDER — MORPHINE SULFATE 2 MG/ML
5 INJECTION, SOLUTION INTRAMUSCULAR; INTRAVENOUS
Status: DISCONTINUED | OUTPATIENT
Start: 2022-12-19 | End: 2022-12-19

## 2022-12-19 RX ORDER — MORPHINE SULFATE ORAL SOLUTION 10 MG/5ML
15 SOLUTION ORAL
Status: DISCONTINUED | OUTPATIENT
Start: 2022-12-19 | End: 2022-12-20 | Stop reason: HOSPADM

## 2022-12-19 RX ORDER — MORPHINE SULFATE 4 MG/ML
4 INJECTION, SOLUTION INTRAMUSCULAR; INTRAVENOUS
Status: CANCELLED | OUTPATIENT
Start: 2022-12-19

## 2022-12-19 RX ORDER — MORPHINE SULFATE 15 MG/1
15 TABLET ORAL EVERY 4 HOURS PRN
Status: DISCONTINUED | OUTPATIENT
Start: 2022-12-19 | End: 2022-12-19

## 2022-12-19 RX ADMIN — LEVOTHYROXINE SODIUM 125 MCG: 0.1 TABLET ORAL at 06:12

## 2022-12-19 RX ADMIN — APIXABAN 5 MG: 5 TABLET, FILM COATED ORAL at 09:12

## 2022-12-19 RX ADMIN — APIXABAN 5 MG: 5 TABLET, FILM COATED ORAL at 08:12

## 2022-12-19 RX ADMIN — POLYETHYLENE GLYCOL 3350 17 G: 17 POWDER, FOR SOLUTION ORAL at 09:12

## 2022-12-19 RX ADMIN — HYDROCODONE BITARTRATE AND ACETAMINOPHEN 2 TABLET: 5; 325 TABLET ORAL at 12:12

## 2022-12-19 RX ADMIN — MORPHINE SULFATE 4 MG: 2 INJECTION, SOLUTION INTRAMUSCULAR; INTRAVENOUS at 07:12

## 2022-12-19 RX ADMIN — MORPHINE SULFATE 4 MG: 4 INJECTION, SOLUTION INTRAMUSCULAR; INTRAVENOUS at 02:12

## 2022-12-19 RX ADMIN — ONDANSETRON 4 MG: 2 INJECTION INTRAMUSCULAR; INTRAVENOUS at 01:12

## 2022-12-19 RX ADMIN — HYDROCODONE BITARTRATE AND ACETAMINOPHEN 2 TABLET: 5; 325 TABLET ORAL at 06:12

## 2022-12-19 RX ADMIN — MORPHINE SULFATE 15 MG: 10 SOLUTION ORAL at 06:12

## 2022-12-19 RX ADMIN — MAGNESIUM OXIDE 400 MG (241.3 MG MAGNESIUM) TABLET 800 MG: at 09:12

## 2022-12-19 RX ADMIN — MAGNESIUM OXIDE 400 MG (241.3 MG MAGNESIUM) TABLET 800 MG: at 08:12

## 2022-12-19 NOTE — PROGRESS NOTES
[facility] Medicine  Progress Note    Patient name: Leyda Bravo  MRN: [id number]  Admit Date: 12/17/2022   LOS: 0 days     SUBJECTIVE:     Principal problem: Intractable back pain    Interval History:  Patient was seen and examined bedside, pain is better controlled today.  No acute events overnight as per nursing staff, labs look stable.  Patient wants to go home with hospice.     Scheduled Meds:   apixaban  5 mg Oral BID    fentaNYL  1 patch Transdermal Q72H    levothyroxine  125 mcg Oral Daily    magnesium oxide  800 mg Oral BID    polyethylene glycol  17 g Oral BID     Continuous Infusions:  PRN Meds:acetaminophen, acetaminophen, aluminum-magnesium hydroxide-simethicone, dextrose 10%, dextrose 10%, glucagon (human recombinant), glucose, glucose, HYDROcodone-acetaminophen, insulin aspart U-100, melatonin, morphine, naloxone, ondansetron, prochlorperazine, simethicone, sodium chloride 0.9%    Review of patient's allergies indicates:  No Known Allergies    Review of Systems: As per interval history    OBJECTIVE:     Vital Signs (Most Recent)  Temp: 99 °F (37.2 °C) (12/19/22 0755)  Pulse: 86 (12/19/22 0755)  Resp: 18 (12/19/22 0755)  BP: (!) 155/84 (12/19/22 0755)  SpO2: (!) 94 % (12/19/22 0755)    Vital Signs Range (Last 24H):  Temp:  [97.7 °F (36.5 °C)-99 °F (37.2 °C)]   Pulse:  [78-92]   Resp:  [16-20]   BP: (126-155)/(72-85)   SpO2:  [93 %-100 %]     I & O (Last 24H):  Intake/Output Summary (Last 24 hours) at 12/19/2022 0838  Last data filed at 12/19/2022 0518  Gross per 24 hour   Intake --   Output 500 ml   Net -500 ml         Physical Exam:  General:  Chronically ill lady, moderate distress due to pain  Eyes: No conjunctival injection. No scleral icterus.  ENT: Hearing grossly intact. No discharge from ears. No nasal discharge.   Neck: Supple, trachea midline. No JVD  CVS: RRR. No LE edema BL  Lungs:  No tachypnea or accessory muscle use.  Clear to auscultation bilaterally  Abdomen:   Soft, nontender and nondistended.  No organomegaly  Neuro: AOx3. Moves all extremities. Follows commands. Responds appropriately       Laboratory:  I have reviewed all pertinent lab results within the past 24 hours.    Diagnostic Results:  Reviewed all imaging    ASSESSMENT/PLAN:     Very unfortunate 72-year-old lady with known history of metastatic breast cancer with metastasis to spine, neck came with intractable back pain, lower abdominal and pelvic pain.  Very unfortunate case    Active Hospital Problems    Diagnosis  POA    *Intractable back pain [M54.9]  Yes    Malignant neoplasm of breast (female) [C50.919]  Yes    DVT (deep venous thrombosis) [I82.409]  Yes    Obesity, Class III, BMI 40-49.9 (morbid obesity) [E66.01]  Yes      Resolved Hospital Problems   No resolved problems to display.         Plan:   Generous pain control-continue fentanyl patch, p.r.n. Norco and p.r.n. IV morphine  Carter's catheter for comfort care  Aggressive bowel regimen  Consult Heme-Onc-thank you  Consulted palliative Care-thank you  Continue Eliquis as improvement in prior DVT  Continue essential home medications  Consult  for home hospice arrangement  Discontinue telemetry monitor  Discontinue daily labs  Poor prognosis    Upon addressing code status and goals of care, patient and family clearly expressed to change code status to DNR.  Patient and family are interested in pursuing hospice at home.   are working on arranging hospice        VTE Risk Mitigation (From admission, onward)           Ordered     apixaban tablet 5 mg  2 times daily         12/17/22 1943     IP VTE HIGH RISK PATIENT  Once         12/17/22 1943     Place sequential compression device  Until discontinued         12/17/22 1943     Reason for No Pharmacological VTE Prophylaxis  Once        Question:  Reasons:  Answer:  Already adequately anticoagulated on oral Anticoagulants    12/17/22 1943                        Department  Hospital Medicine  [facility]  Chinedu Kline MD  Date of service: 12/19/2022

## 2022-12-19 NOTE — SUBJECTIVE & OBJECTIVE
Interval History: ***    Past Medical History:   Diagnosis Date    Breast cancer     Hypertension     Obese     Thyroid disease     hypothyroidism       Past Surgical History:   Procedure Laterality Date     SECTION      CHOLECYSTECTOMY      COLON SURGERY      colostomy    HERNIA REPAIR      KIDNEY SURGERY Left 2006    removal       Review of patient's allergies indicates:  No Known Allergies    Medications:  Continuous Infusions:  Scheduled Meds:   apixaban  5 mg Oral BID    fentaNYL  1 patch Transdermal Q72H    levothyroxine  125 mcg Oral Daily    magnesium oxide  800 mg Oral BID    polyethylene glycol  17 g Oral BID     PRN Meds:acetaminophen, acetaminophen, aluminum-magnesium hydroxide-simethicone, dextrose 10%, dextrose 10%, glucagon (human recombinant), glucose, glucose, HYDROcodone-acetaminophen, insulin aspart U-100, melatonin, morphine, naloxone, ondansetron, prochlorperazine, simethicone, sodium chloride 0.9%    Family History    None       Tobacco Use    Smoking status: Never    Smokeless tobacco: Never   Substance and Sexual Activity    Alcohol use: No    Drug use: No    Sexual activity: Not on file       Review of Systems  Objective:     Vital Signs (Most Recent):  Temp: 99 °F (37.2 °C) (22 075)  Pulse: 86 (22 075)  Resp: 18 (22 075)  BP: (!) 155/84 (22 075)  SpO2: (!) 94 % (22 075)   Vital Signs (24h Range):  Temp:  [97.7 °F (36.5 °C)-99 °F (37.2 °C)] 99 °F (37.2 °C)  Pulse:  [78-92] 86  Resp:  [16-20] 18  SpO2:  [93 %-100 %] 94 %  BP: (126-155)/(72-85) 155/84     Weight: 93.8 kg (206 lb 12.7 oz)  Body mass index is 37.82 kg/m².    Physical Exam    Review of Symptoms      Symptom Assessment (ESAS 0-10 Scale)  Pain:  0  Dyspnea:  0  Anxiety:  0  Nausea:  0  Depression:  0  Anorexia:  0  Fatigue:  0  Insomnia:  0  Restlessness:  0  Agitation:  0           Advance Care Planning   Advance Directives:   Goals of Care: The {ACP PT PRECIOUSISABEL POA:76006} endorses that  what is most important right now is to focus on {ACPPTFOUCS:65948}    Accordingly, we have decided that the best plan to meet the patient's goals includes {ACPPTFOUCS2:45461}       Significant Labs: {Results:83453}  CBC:   Recent Labs   Lab 12/18/22  0513   WBC 7.97   HGB 13.0   HCT 40.9   MCV 97        BMP:  No results for input(s): GLU, NA, K, CL, CO2, BUN, CREATININE, CALCIUM, MG in the last 24 hours.  LFT:  Lab Results   Component Value Date    AST 17 12/17/2022    ALKPHOS 101 12/17/2022    BILITOT 1.1 (H) 12/17/2022     Albumin:   Albumin   Date Value Ref Range Status   12/17/2022 3.4 (L) 3.5 - 5.2 g/dL Final     Protein:   Total Protein   Date Value Ref Range Status   12/17/2022 7.2 6.0 - 8.4 g/dL Final     Lactic acid:   Lab Results   Component Value Date    LACTATE 1.5 12/17/2022       Significant Imaging: {Imaging Review:73077}

## 2022-12-19 NOTE — CHAPLAIN
I visited with patient briefly today.  Pt has strong spiritual and emotional support from [employer].

## 2022-12-19 NOTE — CONSULTS
Consult noted; chart reviewed. Pt is a DNR; no ACP docs noted. Pt lying in bed AAOx4. Introduced Palliative care as an extra layer of support for our patients and families dealing with chronic illnesses. Patient states she defers all decision making to her Daughter Katherine Tinajero; pt agreeable for PC Team to call her. Pt reports 10/10 sharp shooting constant abdominal pain. States current medications only work for a short while. She states she has not been able to get up out of bed but when she does move it makes the pain worse. She also reports nausea and vomiting at times but not at this moment. Dr. Samuels updated via phone.     Called Katherine Tinajero [phone]. Daughter states she lives with the patient. They desire to go home with [facility] hospice at the patient's Sister's house: [address] [place] [address]. Katlyn with [facility] is meeting with the family today. Daughter denies any further questions at this time. Has my contact info for any additional needs. CM and Medical team updated. PC Team will continue to follow.     Met with patient again at the bedside and communicated to her the discharge plan. Pt is agreeable to going home with [facility] hospice at her sister's house. PC Team will continue to follow.

## 2022-12-19 NOTE — CARE UPDATE
CM spoke with pt daughter Katherine [phone] and [facility] chosen. Referral sent. CM to follow.     11:12 Pt family requests referral to be sent to [facility]; referral sent. CM to follow.     11:42 CM spoke with Katlyn of [facility] [phone] & told pt will go home to sister's home at [address] [facility] [place] [address] and asked to fax 3 day packet to [phone]. Fax sent per request. CM to follow.

## 2022-12-19 NOTE — HPI
72-year-old female with breast cancer with metastases to spine and bilateral hips.  She presents ED with worsening back pain.  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  Family has expressed goals of comfort been going home.  Social work working to set hospice.  I have been asked to assist with goals of care.

## 2022-12-19 NOTE — PROGRESS NOTES
RD screen for  MST 5. Incorrect; 28 lbs, 12 % weight loss in 1 year--not significant.  Patient with diagnosis metastatic breast cancer with mets to the spine presented to the ER for severe back pain.  Hospice consulted per CM note.   Consult RD should plan of care change.

## 2022-12-20 ENCOUNTER — TELEPHONE (OUTPATIENT)
Dept: PHARMACY | Facility: CLINIC | Age: 72
End: 2022-12-20
Payer: MEDICARE

## 2022-12-20 VITALS
OXYGEN SATURATION: 93 % | BODY MASS INDEX: 38.06 KG/M2 | DIASTOLIC BLOOD PRESSURE: 74 MMHG | HEIGHT: 62 IN | TEMPERATURE: 98 F | SYSTOLIC BLOOD PRESSURE: 115 MMHG | HEART RATE: 82 BPM | RESPIRATION RATE: 18 BRPM | WEIGHT: 206.81 LBS

## 2022-12-20 PROCEDURE — 25000003 PHARM REV CODE 250: Performed by: INTERNAL MEDICINE

## 2022-12-20 PROCEDURE — 99232 SBSQ HOSP IP/OBS MODERATE 35: CPT | Mod: ,,, | Performed by: INTERNAL MEDICINE

## 2022-12-20 PROCEDURE — 25000003 PHARM REV CODE 250: Performed by: HOSPITALIST

## 2022-12-20 PROCEDURE — 99232 PR SUBSEQUENT HOSPITAL CARE,LEVL II: ICD-10-PCS | Mod: ,,, | Performed by: INTERNAL MEDICINE

## 2022-12-20 PROCEDURE — 63600175 PHARM REV CODE 636 W HCPCS: Performed by: INTERNAL MEDICINE

## 2022-12-20 RX ADMIN — MORPHINE SULFATE 5 MG: 2 INJECTION, SOLUTION INTRAMUSCULAR; INTRAVENOUS at 02:12

## 2022-12-20 RX ADMIN — MORPHINE SULFATE 5 MG: 2 INJECTION, SOLUTION INTRAMUSCULAR; INTRAVENOUS at 03:12

## 2022-12-20 RX ADMIN — LEVOTHYROXINE SODIUM 125 MCG: 0.1 TABLET ORAL at 05:12

## 2022-12-20 RX ADMIN — MORPHINE SULFATE 5 MG: 2 INJECTION, SOLUTION INTRAMUSCULAR; INTRAVENOUS at 09:12

## 2022-12-20 RX ADMIN — MORPHINE SULFATE 15 MG: 10 SOLUTION ORAL at 01:12

## 2022-12-20 RX ADMIN — APIXABAN 5 MG: 5 TABLET, FILM COATED ORAL at 08:12

## 2022-12-20 RX ADMIN — MAGNESIUM OXIDE 400 MG (241.3 MG MAGNESIUM) TABLET 800 MG: at 08:12

## 2022-12-20 NOTE — PLAN OF CARE
12/20/22 1442   Final Note   Assessment Type Final Discharge Note   Anticipated Discharge Disposition [facility]ospiceHome   What phone number can be called within the next 1-3 days to see how you are doing after discharge? [phone]   Post-Acute Status   Post-Acute Authorization Hospice   Hospice Status Set-up Complete/Auth obtained   Discharge Delays (!) Ambulance Transport/Facility Transport     Patient cleared for discharge from case management standpoint. Pt to discharge with [facility] hospice. ADT 30 orders put in for ambulance transport.     Chart and discharge orders reviewed.  Patient discharged home with no further case management needs.

## 2022-12-20 NOTE — TELEPHONE ENCOUNTER
Incoming call from Magi at [facility]. Patient discharging today, and Kisquali was discontinued in error by Dr Cornell (hospitalist), and MD unable to reactivate order. Reactivated prescription with MD permission, and discontinued new prescription sent in. Patient discharging from hospital today on hospice, and is aware to continue on Kisqali at this time.

## 2022-12-20 NOTE — PLAN OF CARE
Problem: Infection  Goal: Absence of Infection Signs and Symptoms  Outcome: Met     Problem: Adult Inpatient Plan of Care  Goal: Plan of Care Review  Outcome: Met  Goal: Patient-Specific Goal (Individualized)  Outcome: Met  Goal: Absence of Hospital-Acquired Illness or Injury  Outcome: Met  Goal: Optimal Comfort and Wellbeing  Outcome: Met  Goal: Readiness for Transition of Care  Outcome: Met     Problem: Coping Ineffective  Goal: Effective Coping  Outcome: Met     Problem: Skin Injury Risk Increased  Goal: Skin Health and Integrity  Outcome: Met

## 2022-12-20 NOTE — PROGRESS NOTES
Received call from Katlyn with [facility]. Equipment has been delivered and they are ready for pt to discharge home today. Will need ambulance transport. Case management updated. PC team will continue to follow. Please contact for any additional needs.

## 2022-12-20 NOTE — NURSING
IV discontinued patient tolerated well. All paperwork given to EMT with [facility]. Daughter notified Hospice nurse that patient was being transported. Patient discharged via stretcher with [facility].

## 2022-12-20 NOTE — PLAN OF CARE
Problem: Infection  Goal: Absence of Infection Signs and Symptoms  Outcome: Ongoing, Progressing     Problem: Adult Inpatient Plan of Care  Goal: Plan of Care Review  Outcome: Ongoing, Progressing  Goal: Patient-Specific Goal (Individualized)  Outcome: Ongoing, Progressing  Goal: Absence of Hospital-Acquired Illness or Injury  Outcome: Ongoing, Progressing  Goal: Optimal Comfort and Wellbeing  Outcome: Ongoing, Progressing  Goal: Readiness for Transition of Care  Outcome: Ongoing, Progressing     Problem: Coping Ineffective  Goal: Effective Coping  Outcome: Ongoing, Progressing     Problem: Skin Injury Risk Increased  Goal: Skin Health and Integrity  Outcome: Ongoing, Progressing     Problem: Fall Injury Risk  Goal: Absence of Fall and Fall-Related Injury  Outcome: Ongoing, Progressing

## 2022-12-20 NOTE — DISCHARGE SUMMARY
[facility]  Discharge Summary  Patient Name: Leyda Bravo MRN: [id number]   Patient Class: IP- Inpatient  Length of Stay: 1   Admission Date: 12/17/2022 12:17 PM Attending Physician: Mikey Trammell MD   Primary Care Provider: DANGELO Azul Face-to-Face encounter date: 12/20/2022   Chief Complaint: Back Pain (Bone Cancer pt)    Date of Discharge: 12/20/2022  Discharge Disposition:  [facility] home with hospice  Condition: Stable       Reason for Hospitalization   Back pain in the setting of metastatic breast cancer      Patient Active Problem List   Diagnosis    History of total colectomy    Essential hypertension    Obesity, Class III, BMI 40-49.9 (morbid obesity)    Hypothyroidism due to acquired atrophy of thyroid    History of nephrectomy, unilateral    Acute pulmonary embolism    DVT (deep venous thrombosis)    Neck mass    Malignant neoplasm of breast (female)    Intractable back pain       Brief History of Present Illness   Leyda Bravo is a 72 y.o. White female with known history of metastatic breast cancer with mets to the spine presented to the ER for severe back pain. It started two weeks and progressively getting worst. She has no associated incontinence, urinary retention type symptoms or any lower extremity weakness numbness tingling or paresthesias. With these symptoms, she presented to the ER for evaluation. She was given Dilaudid x 3 with no improvement. CT scan abdomen proctitis and mass projecting along the anterior surface of the pancreatic body. Duplex lower extremities showed chronic occlusive thrombus involving the right superficial femoral vein both proximal and distally no reproducible recanalization on the current study. The popliteal vein now shows no reproducible flow. Posterior tibial vein likewise occluded. Hospital medicine consulted for admission.     Hospital Course By Problem with Pertinent Findings   Patient presented with intractable back pain and also worsening  "pain to the neck, spine, lower abdomen and pelvic region.  Patient has decided against radiation treatment and has agreed to hospice.  Patient will be discharged to [facility] with hospice today.      Patient was seen and examined on the date of discharge and determined to be suitable for discharge.    Physical Exam  /74   Pulse 82   Temp 98 °F (36.7 °C) (Oral)   Resp 16   Ht 5' 2" (1.575 m)   Wt 93.8 kg (206 lb 12.7 oz)   SpO2 (!) 93%   BMI 37.82 kg/m²   Vitals reviewed.    Constitutional: No distress.   Rest of physical exam deferred as patient is on comfort care.    Following labs were Reviewed   No results for input(s): WBC, HGB, HCT, PLT, GLUCOSE, CALCIUM, ALBUMIN, PROT, NA, K, CO2, CL, BUN, CREATININE, ALKPHOS, ALT, AST, BILITOT in the last 24 hours.  No results found for: POCTGLUCOSE       Microbiology Results (last 7 days)       Procedure Component Value Units Date/Time    Blood culture #1 **CANNOT BE ORDERED STAT** [[id number]] Collected: 12/17/22 1336    Order Status: Completed Specimen: Blood from Peripheral, Antecubital, Left Updated: 12/19/22 1432     Blood Culture, Routine No Growth to date      No Growth to date      No Growth to date    Blood culture #2 **CANNOT BE ORDERED STAT** [[id number]]     Order Status: No result Specimen: Blood           CT Abdomen Pelvis With Contrast   Final Result      CTA Chest Non-Coronary (PE Studies)   Final Result      US Lower Extremity Veins Bilateral   Final Result      X-Ray Chest AP Portable   Final Result          No results found for this or any previous visit.      Consultants and Procedures   Consultants:  Consults (From admission, onward)          Status Ordering Provider     Inpatient consult to Palliative Care  Once        Provider:  Ankit Samuels MD    Completed BOO CARDOSO     Inpatient consult to   Once        Provider:  (Not yet assigned)    Acknowledged BOO CARDOSO     IP consult to case management  Once    "     Provider:  (Not yet assigned)    Completed HAYDEE MARTIN     Inpatient consult to Hematology Oncology  Once        Provider:  JUDSON Jacob MD    Completed HAYDEE MARTIN     Inpatient consult to Hospitalist  Once        Provider:  Haydee Martin MD    Acknowledged PASQUALE PICKETT                Discharge Information:   Diet:  Comfort foods.           Pending laboratory work/Tests to be performed/followed by the Primary care Physician:    The patient was discharged in the care of her parents//wife/family/caregiver, with discharge instructions were reviewed in written and verbal form. All pertinent questions were discussed and prescriptions were provided. The importance of making follow up appointments and compliance of medications has been stressed repeatedly. The patient will follow up in 1 week or sooner as needed with the PCP, and the patient is on board with the plan. Upon discharge, patient needs to be on following medications.    Discharge Medications:     Medication List        CONTINUE taking these medications      acetaminophen 500 MG tablet  Commonly known as: TYLENOL     ALEVE ORAL     apixaban 5 mg Tab  Commonly known as: ELIQUIS     cyclobenzaprine 5 MG tablet  Commonly known as: FLEXERIL     * HYDROcodone-acetaminophen 7.5-325 mg per tablet  Commonly known as: NORCO  Take 1 tablet by mouth every 6 (six) hours as needed for Pain.     * HYDROcodone-acetaminophen  mg per tablet  Commonly known as: NORCO  Take 1 tablet by mouth every 6 (six) hours as needed for Pain.     levothyroxine 125 MCG tablet  Commonly known as: SYNTHROID  Take 1 tablet (125 mcg total) by mouth once daily.     mupirocin 2 % ointment  Commonly known as: BACTROBAN     ondansetron 8 MG tablet  Commonly known as: ZOFRAN  Take 1 tablet (8 mg total) by mouth every 8 (eight) hours as needed for Nausea.     traMADoL 50 mg tablet  Commonly known as: ULTRAM           * This list has 2 medication(s) that are  the same as other medications prescribed for you. Read the directions carefully, and ask your doctor or other care provider to review them with you.                STOP taking these medications      atenoloL 25 MG tablet  Commonly known as: TENORMIN     KISQALI 600 mg/day (200 mg x 3) Tab  Generic drug: ribociclib            ASK your doctor about these medications      letrozole 2.5 mg Tab  Commonly known as: FEMARA  Take 1 tablet (2.5 mg total) by mouth once daily.                I spent 45 inutes preparing the discharge including reviewing records from previous encounters, preparation of discharge summary, assessing and final examination of the patient, discharge medicine reconciliation, discussing plan of care, follow up and education and prescriptions.       Mikey Abad  [facility] Hospitalist  12/20/2022

## 2022-12-20 NOTE — CONSULTS
[facility] in Patient hematology Oncology Initial encounter note    12/19/22    Subjective:      Patient ID:   Leyda Bravo  72 y.o. female  1950  HEIDI Brink MD      Chief Complaint:   Back pain, uncontrolled    HPI:    She has hx of breast cancer, now with extensive bone metastases.  Admitted with uncontrolled back pain sx.  She saw Rad Rx MD for palliative Rad Rx to painful areas.  She has decided to forego Rad Rx for pain control.    She received  the Kisquali and Letrozole from specialty pharmacy, she will begin this out pt.    She has asked to stop further testing,  She has requested comfort care and hospice referral.  She plans to go to [facility] and hospice.    Discussed with Dr. Marcos.    Hx of a 1.7 cm mass in the left breast and an 11 mm nodule in the pancreas and a small lung Nodule of 3 mm in size.    Mammogram and U/S support that L breast nodule is benign, but nodule found at areola of R breast.  Bx of R breast mass  Showed Invasive lobular Carcinoma, LCIS, tumor 1.1 cm, grade 1, ERP+, PRP+,   H2N neg.    Discussed R lumpectomy, Sentinal node Bx and adjuvant Rad Rx.  Vs R mastectomy, Sentinal Node Bx.  Refer to Dr. Zac Odom of surgery.  For surgery 10/12/21.  BrCa1 & 2 Negative.  11/9/21.  She saw  Dr. Raphael no Rad Rx recommended.    Last seen 1/2022.  Referred back with recurrent metastatic dx.  Hip pain x's 2 months. L neck anterior cervical LN x's 1-2 months.  Dr. Street bx, metastatic breast cancer.  S/P R mastectomy.  RLL 12 mm nodule, metastatis dx?  Collected CBC, CMP, tumor markers and Foundation One Study.    Has history of colitis and had total colectomy in 2005, her course was complicated by a pulmonary embolus.  She had hernia repair in 2016 and had complications with wound dehiscence and her course was complicated by a pulmonary embolus.  Other history includes Caesarean section x1, cholecystectomy, left nephrectomy for stones in 1997, hernia  repair x2.  She is a  3 para 3 miscarriage 0 individual    She she has history of hypertension, heart disease, arthritis in the knees hips and ankles.  Skin cancer has removed from the face in the past and from the shoulder area.  She has hypothyroidism.  Of    She smoked from age [age] for approximately 20 years, smoking 1 pack per day.  She does not drink alcohol with regularity.  She denies allergies to medications.  She worked as a garment .    Two of her sisters have had miscarriages, there is no family history of PE or DVT.  Family history does include heart disease, colitis, diabetes, renal disease.  She has a daughter who had ovarian cancer, a daughter who had anemia and her mom  had heart disease.    ROS:   GEN: normal without any fever, night sweats or weight loss  HEENT: normal with no HA's, sore throat, stiff neck, changes in vision  CV: normal with no CP, SOB, PND, GROVE or orthopnea  PULM: normal with no SOB, cough, hemoptysis, sputum or pleuritic pain  GI: normal with no abdominal pain, nausea, vomiting, constipation, diarrhea, melanotic stools, BRBPR, or hematemesis  : normal with no hematuria, dysuria  BREAST: normal with no mass, discharge, pain  SKIN: normal with no rash, erythema, bruising, or swelling     Past Medical History:   Diagnosis Date    Breast cancer     Hypertension     Obese     Thyroid disease     hypothyroidism     Past Surgical History:   Procedure Laterality Date     SECTION      CHOLECYSTECTOMY      COLON SURGERY      colostomy    HERNIA REPAIR      KIDNEY SURGERY Left 2006    removal       Review of patient's allergies indicates:  No Known Allergies  Social History     Socioeconomic History    Marital status:    Tobacco Use    Smoking status: Never    Smokeless tobacco: Never   Substance and Sexual Activity    Alcohol use: No    Drug use: No         Current Facility-Administered Medications:     acetaminophen tablet 1,000 mg, 1,000 mg, Oral,  Q8H PRN, Haydee Bowers MD    acetaminophen tablet 650 mg, 650 mg, Oral, Q4H PRN, Haydee Bowers MD    aluminum-magnesium hydroxide-simethicone 200-200-20 mg/5 mL suspension 30 mL, 30 mL, Oral, QID PRN, Haydee Bowers MD    apixaban tablet 5 mg, 5 mg, Oral, BID, Haydee Bowers MD, 5 mg at 12/19/22 2014    dextrose 10% bolus 125 mL 125 mL, 12.5 g, Intravenous, PRN, Haydee Bowers MD    dextrose 10% bolus 250 mL 250 mL, 25 g, Intravenous, PRN, Haydee Bowers MD    fentaNYL 25 mcg/hr 1 patch, 1 patch, Transdermal, Q72H, Chinedu Kline MD, 1 patch at 12/18/22 1700    glucagon (human recombinant) injection 1 mg, 1 mg, Intramuscular, PRN, Haydee Bowers MD    glucose chewable tablet 16 g, 16 g, Oral, PRN, Haydee Bowers MD    glucose chewable tablet 24 g, 24 g, Oral, PRN, Haydee Bowers MD    insulin aspart U-100 pen 0-5 Units, 0-5 Units, Subcutaneous, QID (AC + HS) PRN, Haydee Bowers MD    levothyroxine tablet 125 mcg, 125 mcg, Oral, Daily, Haydee Bowers MD, 125 mcg at 12/19/22 0615    magnesium oxide tablet 800 mg, 800 mg, Oral, BID, Chinedu Kline MD, 800 mg at 12/19/22 2014    melatonin tablet 9 mg, 9 mg, Oral, Nightly PRN, Haydee Bowers MD, 9 mg at 12/17/22 2131    morphine 10 mg/5 mL solution 15 mg, 15 mg, Oral, Q2H PRN, Ankit Samuels MD, 15 mg at 12/19/22 1821    morphine injection 5 mg, 5 mg, Intravenous, Q1H PRN, Ankit Samuels MD, 4 mg at 12/19/22 1908    naloxone 0.4 mg/mL injection 0.02 mg, 0.02 mg, Intravenous, PRN, Haydee Bowers MD    ondansetron injection 4 mg, 4 mg, Intravenous, Q6H PRN, Haydee Bowers MD, 4 mg at 12/19/22 1313    polyethylene glycol packet 17 g, 17 g, Oral, BID, Chinedu Kline MD, 17 g at 12/19/22 0956    prochlorperazine injection Soln 5 mg, 5 mg, Intravenous, Q6H PRN, Haydee Bowers MD, 5 mg at 12/18/22 1825    simethicone chewable tablet 80 mg, 1 tablet, Oral, QID PRN, Haydee Bowers,  "MD    sodium chloride 0.9% flush 10 mL, 10 mL, Intravenous, Q6H PRN, Haydee Bowesr MD          Objective:   Vitals:  Blood pressure (!) 146/86, pulse 99, temperature 98.2 °F (36.8 °C), resp. rate 18, height 5' 2" (1.575 m), weight 93.8 kg (206 lb 12.7 oz), SpO2 (!) 92 %.    Physical Examination:   GEN: no apparent distress, comfortable  HEAD: atraumatic and normocephalic  EYES: no pallor, no icterus  ENT: no pharyngeal erythema, external ears WNL; no nasal discharge  NECK: no masses, thyroid normal, trachea midline, no LAD/LN's, supple  CV: RRR with no murmur; normal pulse; normal S1 and S2; no pedal edema  CHEST: Normal respiratory effort; CTAB; normal breath sounds; no wheeze or crackles  ABDOM: nontender and nondistended; soft;  no rebound/guarding, L/S NP  MUSC/Skeletal: ROM normal; no crepitus; joints normal;   EXTREM:  She has extensive spider veins at the lower extremities, calves are nontender, without palpable venous cord, she does have 1+ edema bilaterally.  SKIN: no rashes, lesions, ulcers, petechiae   : no cvat  NEURO: grossly intact; motor/sensory WNL;  no tremors  PSYCH: normal mood, affect and behavior  LYMPH: normal cervical, supraclavicular, axillary and groin LN's  BREASTS:  Left and right breast are nontender, without discharge, and without palpable mass.    Labs:   Lab Results   Component Value Date    WBC 7.97 12/18/2022    HGB 13.0 12/18/2022    HCT 40.9 12/18/2022    MCV 97 12/18/2022     12/18/2022    CMP  Sodium   Date Value Ref Range Status   12/18/2022 137 136 - 145 mmol/L Final     Potassium   Date Value Ref Range Status   12/18/2022 4.1 3.5 - 5.1 mmol/L Final     Chloride   Date Value Ref Range Status   12/18/2022 100 95 - 110 mmol/L Final     CO2   Date Value Ref Range Status   12/18/2022 27 23 - 29 mmol/L Final     Glucose   Date Value Ref Range Status   12/18/2022 113 (H) 70 - 110 mg/dL Final     BUN   Date Value Ref Range Status   12/18/2022 7 (L) 8 - 23 mg/dL Final "     Creatinine   Date Value Ref Range Status   12/18/2022 0.7 0.5 - 1.4 mg/dL Final     Calcium   Date Value Ref Range Status   12/18/2022 9.1 8.7 - 10.5 mg/dL Final     Total Protein   Date Value Ref Range Status   12/17/2022 7.2 6.0 - 8.4 g/dL Final     Albumin   Date Value Ref Range Status   12/17/2022 3.4 (L) 3.5 - 5.2 g/dL Final     Total Bilirubin   Date Value Ref Range Status   12/17/2022 1.1 (H) 0.1 - 1.0 mg/dL Final     Comment:     For infants and newborns, interpretation of results should be based  on gestational age, weight and in agreement with clinical  observations.    Premature Infant recommended reference ranges:  Up to 24 hours.............<8.0 mg/dL  Up to 48 hours............<12.0 mg/dL  3-5 days..................<15.0 mg/dL  6-29 days.................<15.0 mg/dL       Alkaline Phosphatase   Date Value Ref Range Status   12/17/2022 101 55 - 135 U/L Final     AST   Date Value Ref Range Status   12/17/2022 17 10 - 40 U/L Final     ALT   Date Value Ref Range Status   12/17/2022 15 10 - 44 U/L Final     Anion Gap   Date Value Ref Range Status   12/18/2022 10 8 - 16 mmol/L Final     eGFR if    Date Value Ref Range Status   06/14/2021 >60.0 >60 mL/min/1.73 m^2 Final     eGFR if non    Date Value Ref Range Status   06/14/2021 >60.0 >60 mL/min/1.73 m^2 Final     Comment:     Calculation used to obtain the estimated glomerular filtration  rate (eGFR) is the CKD-EPI equation.      She had  hypercoagulation evaluation and 2017 showed that she was homozygous positive for MTHFR mutation and her homocystine level was increased at 22.  At that time she was started on Metanx daily.     A CTA runoff study was done June 14, 2021 at [facility].  This study showed vascular disease involving the aorta and pelvic and lower extremity arteries., fatty liver was seen, vena cava filter was in place, prominence of pelvic veins was noted.  11 mm low-density mass in the body of the  pancreas was seen.  Large right abdominal hernia was noted without obstruction.      Previous CT scan of lumbar spine show mild multilevel degeneration with facet arthropathy.  CT scan of the chest from June 8, 2021 showed bronchitis without pulmonary embolus being seen.  A 17 mm nodule was noted in the left breast.    Lower extremity venous study showed acute occlusive deep vein thrombosis bilaterally with clot in the common and superficial femoral veins, popliteal vein, extending into the calves bilaterally.  Right greater saphenous vein was occluded.      Partial Mastectomy 2.3 cm lobular invasive Ca, grade 2 dx, 1/1 sentinal LN negative, possitive margin      Assessment:   (1) 72 y.o. female with diagnosis of extensive bilateral DVT, timely related to a previous fall.  Presently treated with Eliquis 5 mg p.o. b.i.d., clinically improving with decrease symptoms and decreased physical findings at the left and right leg.  History of pulmonary emboli in the past following surgical procedures.    (2) previous hypercoagulation evaluation showed she was double homozygous positive with MTHFR mutation and homocystine level was increased at 22. I started her on Metanx.    She is on Eliquis b.i.d.    (3)R breast Invasive Lobular Ca, LCIS.  Daughter with L breast mass.  Daughter with ovarian cancer hx.  BRCA 1 & 2, breast cancer gene, HH.  Negative.    She has pancreas mass, on previous study.      (4)Metastatic breast cancer.  She has decided against Rad Rx for paliation.    (5)She will begin Kisquali, letrozole daily at home.    (6)She prefers to defer further work up.  She wants D/C to [facility] and referral to hospice.    Discussed with Dr. Marcos.

## 2022-12-20 NOTE — PROGRESS NOTES
[facility] in Patient hematology Oncology Subsequent encounter note    12/20/22    Subjective:      Patient ID:   Leyda Bravo  72 y.o. female  1950  Raquel Carmona NP        Chief Complaint:   Back pain, uncontrolled    HPI:    She has hx of breast cancer, now with extensive bone metastases.  Admitted with uncontrolled back pain sx.  She saw Rad Rx MD for palliative Rad Rx to painful areas.  She has decided to forego Rad Rx for pain control.    She received  the Kisquali and Letrozole from specialty pharmacy, she will begin this out pt.    She has asked to stop further testing,  She has requested comfort care and hospice referral.  She plans to go to [facility] and hospice.    Discussed with Dr. Marcos.  Home today with hospice, [facility].    Hx of a 1.7 cm mass in the left breast and an 11 mm nodule in the pancreas and a small lung Nodule of 3 mm in size.    Mammogram and U/S support that L breast nodule is benign, but nodule found at areola of R breast.  Bx of R breast mass  Showed Invasive lobular Carcinoma, LCIS, tumor 1.1 cm, grade 1, ERP+, PRP+,   H2N neg.    Discussed R lumpectomy, Sentinal node Bx and adjuvant Rad Rx.  Vs R mastectomy, Sentinal Node Bx.  Refer to Dr. Zac Odom of surgery.  For surgery 10/12/21.  BrCa1 & 2 Negative.  11/9/21.  She saw  Dr. Raphael no Rad Rx recommended.    Last seen 1/2022.  Referred back with recurrent metastatic dx.  Hip pain x's 2 months. L neck anterior cervical LN x's 1-2 months.  Dr. Street bx, metastatic breast cancer.  S/P R mastectomy.  RLL 12 mm nodule, metastatis dx?  Collected CBC, CMP, tumor markers and Foundation One Study.    Has history of colitis and had total colectomy in 2005, her course was complicated by a pulmonary embolus.  She had hernia repair in 2016 and had complications with wound dehiscence and her course was complicated by a pulmonary embolus.  Other history includes Caesarean section x1, cholecystectomy, left nephrectomy  for stones in , hernia repair x2.  She is a  3 para 3 miscarriage 0 individual    She she has history of hypertension, heart disease, arthritis in the knees hips and ankles.  Skin cancer has removed from the face in the past and from the shoulder area.  She has hypothyroidism.  Of    She smoked from age [age] for approximately 20 years, smoking 1 pack per day.  She does not drink alcohol with regularity.  She denies allergies to medications.  She worked as a garment .    Two of her sisters have had miscarriages, there is no family history of PE or DVT.  Family history does include heart disease, colitis, diabetes, renal disease.  She has a daughter who had ovarian cancer, a daughter who had anemia and her mom  had heart disease.    ROS:   GEN: normal without any fever, night sweats or weight loss  HEENT: normal with no HA's, sore throat, stiff neck, changes in vision  CV: normal with no CP, SOB, PND, GROVE or orthopnea  PULM: normal with no SOB, cough, hemoptysis, sputum or pleuritic pain  GI: normal with no abdominal pain, nausea, vomiting, constipation, diarrhea, melanotic stools, BRBPR, or hematemesis  : normal with no hematuria, dysuria  BREAST: normal with no mass, discharge, pain  SKIN: normal with no rash, erythema, bruising, or swelling     Past Medical History:   Diagnosis Date    Breast cancer     Hypertension     Obese     Thyroid disease     hypothyroidism     Past Surgical History:   Procedure Laterality Date     SECTION      CHOLECYSTECTOMY      COLON SURGERY      colostomy    HERNIA REPAIR      KIDNEY SURGERY Left 2006    removal       Review of patient's allergies indicates:  No Known Allergies  Social History     Socioeconomic History    Marital status:    Tobacco Use    Smoking status: Never    Smokeless tobacco: Never   Substance and Sexual Activity    Alcohol use: No    Drug use: No         Current Facility-Administered Medications:     acetaminophen tablet  1,000 mg, 1,000 mg, Oral, Q8H PRN, Haydee Bowers MD    acetaminophen tablet 650 mg, 650 mg, Oral, Q4H PRN, Haydee Bowers MD    aluminum-magnesium hydroxide-simethicone 200-200-20 mg/5 mL suspension 30 mL, 30 mL, Oral, QID PRN, Haydee Bowers MD    apixaban tablet 5 mg, 5 mg, Oral, BID, Haydee Bowers MD, 5 mg at 12/20/22 0857    dextrose 10% bolus 125 mL 125 mL, 12.5 g, Intravenous, PRN, Haydee Bowers MD    dextrose 10% bolus 250 mL 250 mL, 25 g, Intravenous, PRN, Haydee Bowers MD    fentaNYL 25 mcg/hr 1 patch, 1 patch, Transdermal, Q72H, Chinedu Kline MD, 1 patch at 12/18/22 1700    glucagon (human recombinant) injection 1 mg, 1 mg, Intramuscular, PRN, Haydee Bowers MD    glucose chewable tablet 16 g, 16 g, Oral, PRN, Haydee Bowers MD    glucose chewable tablet 24 g, 24 g, Oral, PRN, Haydee Bowers MD    insulin aspart U-100 pen 0-5 Units, 0-5 Units, Subcutaneous, QID (AC + HS) PRN, Haydee Bowers MD    levothyroxine tablet 125 mcg, 125 mcg, Oral, Daily, Haydee Bowers MD, 125 mcg at 12/20/22 0549    magnesium oxide tablet 800 mg, 800 mg, Oral, BID, Chinedu Kline MD, 800 mg at 12/20/22 0857    melatonin tablet 9 mg, 9 mg, Oral, Nightly PRN, Haydee Bowers MD, 9 mg at 12/17/22 2131    morphine 10 mg/5 mL solution 15 mg, 15 mg, Oral, Q2H PRN, Ankit Samuels MD, 15 mg at 12/20/22 0127    morphine injection 5 mg, 5 mg, Intravenous, Q1H PRN, Ankit Samuels MD, 5 mg at 12/20/22 1556    naloxone 0.4 mg/mL injection 0.02 mg, 0.02 mg, Intravenous, PRN, Haydee Bowers MD    ondansetron injection 4 mg, 4 mg, Intravenous, Q6H PRN, Haydee Bowers MD, 4 mg at 12/19/22 1313    polyethylene glycol packet 17 g, 17 g, Oral, BID, Chinedu Kline MD, 17 g at 12/19/22 0956    prochlorperazine injection Soln 5 mg, 5 mg, Intravenous, Q6H PRN, Haydee Bowers MD, 5 mg at 12/18/22 1826    simethicone chewable tablet 80 mg, 1 tablet, Oral, QID  "PRN, Haydee Bowers MD    sodium chloride 0.9% flush 10 mL, 10 mL, Intravenous, Q6H PRN, Haydee Bowers MD    Current Outpatient Medications:     acetaminophen (TYLENOL) 500 MG tablet, Take 500 mg by mouth every 6 (six) hours as needed., Disp: , Rfl:     apixaban (ELIQUIS) 5 mg Tab, Take 5 mg by mouth 2 (two) times daily., Disp: , Rfl:     HYDROcodone-acetaminophen (NORCO)  mg per tablet, Take 1 tablet by mouth every 6 (six) hours as needed for Pain., Disp: 120 tablet, Rfl: 0    levothyroxine (SYNTHROID) 125 MCG tablet, Take 1 tablet (125 mcg total) by mouth once daily., Disp: 30 tablet, Rfl: 11    mupirocin (BACTROBAN) 2 % ointment, Apply 1 g topically 2 (two) times daily., Disp: , Rfl:     traMADoL (ULTRAM) 50 mg tablet, Take 50 mg by mouth every 6 (six) hours as needed., Disp: , Rfl:     cyclobenzaprine (FLEXERIL) 5 MG tablet, Take 5 mg by mouth 2 (two) times daily as needed., Disp: , Rfl:     HYDROcodone-acetaminophen (NORCO) 7.5-325 mg per tablet, Take 1 tablet by mouth every 6 (six) hours as needed for Pain., Disp: 120 tablet, Rfl: 0    letrozole (FEMARA) 2.5 mg Tab, Take 1 tablet (2.5 mg total) by mouth once daily. (Patient not taking: Reported on 12/17/2022.), Disp: 30 tablet, Rfl: 6    NAPROXEN SODIUM (ALEVE ORAL), Take 1 tablet by mouth 2 (two) times a day., Disp: , Rfl:     ondansetron (ZOFRAN) 8 MG tablet, Take 1 tablet (8 mg total) by mouth every 8 (eight) hours as needed for Nausea., Disp: 30 tablet, Rfl: 2    ribociclib (KISQALI) 600 mg/day (200 mg x 3) Tab, Take 3 tablets (600 mg total) by mouth once daily for 3 weeks (21 days), take 1 week (7 days) off, repeat every 28 days (Patient not taking: Reported on 12/17/2022.), Disp: 63 tablet, Rfl: 6          Objective:   Vitals:  Blood pressure 115/74, pulse 82, temperature 98 °F (36.7 °C), temperature source Oral, resp. rate 18, height 5' 2" (1.575 m), weight 93.8 kg (206 lb 12.7 oz), SpO2 (!) 93 %.    Physical Examination:   GEN: no " apparent distress, comfortable  HEAD: atraumatic and normocephalic  EYES: no pallor, no icterus  ENT: no pharyngeal erythema, external ears WNL; no nasal discharge  NECK: no masses, thyroid normal, trachea midline, no LAD/LN's, supple  CV: RRR with no murmur; normal pulse; normal S1 and S2; no pedal edema  CHEST: Normal respiratory effort; CTAB; normal breath sounds; no wheeze or crackles  ABDOM: nontender and nondistended; soft;  no rebound/guarding, L/S NP  MUSC/Skeletal: ROM normal; no crepitus; joints normal;   EXTREM:  She has extensive spider veins at the lower extremities, calves are nontender, without palpable venous cord, she does have 1+ edema bilaterally.  SKIN: no rashes, lesions, ulcers, petechiae   : no cvat  NEURO: grossly intact; motor/sensory WNL;  no tremors  PSYCH: normal mood, affect and behavior  LYMPH: normal cervical, supraclavicular, axillary and groin LN's  BREASTS:  Left and right breast are nontender, without discharge, and without palpable mass.    Labs:   Lab Results   Component Value Date    WBC 7.97 12/18/2022    HGB 13.0 12/18/2022    HCT 40.9 12/18/2022    MCV 97 12/18/2022     12/18/2022    CMP  Sodium   Date Value Ref Range Status   12/18/2022 137 136 - 145 mmol/L Final     Potassium   Date Value Ref Range Status   12/18/2022 4.1 3.5 - 5.1 mmol/L Final     Chloride   Date Value Ref Range Status   12/18/2022 100 95 - 110 mmol/L Final     CO2   Date Value Ref Range Status   12/18/2022 27 23 - 29 mmol/L Final     Glucose   Date Value Ref Range Status   12/18/2022 113 (H) 70 - 110 mg/dL Final     BUN   Date Value Ref Range Status   12/18/2022 7 (L) 8 - 23 mg/dL Final     Creatinine   Date Value Ref Range Status   12/18/2022 0.7 0.5 - 1.4 mg/dL Final     Calcium   Date Value Ref Range Status   12/18/2022 9.1 8.7 - 10.5 mg/dL Final     Total Protein   Date Value Ref Range Status   12/17/2022 7.2 6.0 - 8.4 g/dL Final     Albumin   Date Value Ref Range Status   12/17/2022 3.4 (L)  3.5 - 5.2 g/dL Final     Total Bilirubin   Date Value Ref Range Status   12/17/2022 1.1 (H) 0.1 - 1.0 mg/dL Final     Comment:     For infants and newborns, interpretation of results should be based  on gestational age, weight and in agreement with clinical  observations.    Premature Infant recommended reference ranges:  Up to 24 hours.............<8.0 mg/dL  Up to 48 hours............<12.0 mg/dL  3-5 days..................<15.0 mg/dL  6-29 days.................<15.0 mg/dL       Alkaline Phosphatase   Date Value Ref Range Status   12/17/2022 101 55 - 135 U/L Final     AST   Date Value Ref Range Status   12/17/2022 17 10 - 40 U/L Final     ALT   Date Value Ref Range Status   12/17/2022 15 10 - 44 U/L Final     Anion Gap   Date Value Ref Range Status   12/18/2022 10 8 - 16 mmol/L Final     eGFR if    Date Value Ref Range Status   06/14/2021 >60.0 >60 mL/min/1.73 m^2 Final     eGFR if non    Date Value Ref Range Status   06/14/2021 >60.0 >60 mL/min/1.73 m^2 Final     Comment:     Calculation used to obtain the estimated glomerular filtration  rate (eGFR) is the CKD-EPI equation.      She had  hypercoagulation evaluation and 2017 showed that she was homozygous positive for MTHFR mutation and her homocystine level was increased at 22.  At that time she was started on Metanx daily.     A CTA runoff study was done June 14, 2021 at [facility].  This study showed vascular disease involving the aorta and pelvic and lower extremity arteries., fatty liver was seen, vena cava filter was in place, prominence of pelvic veins was noted.  11 mm low-density mass in the body of the pancreas was seen.  Large right abdominal hernia was noted without obstruction.      Previous CT scan of lumbar spine show mild multilevel degeneration with facet arthropathy.  CT scan of the chest from June 8, 2021 showed bronchitis without pulmonary embolus being seen.  A 17 mm nodule was noted in the left  breast.    Lower extremity venous study showed acute occlusive deep vein thrombosis bilaterally with clot in the common and superficial femoral veins, popliteal vein, extending into the calves bilaterally.  Right greater saphenous vein was occluded.      Partial Mastectomy 2.3 cm lobular invasive Ca, grade 2 dx, 1/1 sentinal LN negative, possitive margin      Assessment:   (1) 72 y.o. female with diagnosis of extensive bilateral DVT, timely related to a previous fall.  Presently treated with Eliquis 5 mg p.o. b.i.d., clinically improving with decrease symptoms and decreased physical findings at the left and right leg.  History of pulmonary emboli in the past following surgical procedures.    (2) previous hypercoagulation evaluation showed she was double homozygous positive with MTHFR mutation and homocystine level was increased at 22. I started her on Metanx.    She is on Eliquis b.i.d.    (3)R breast Invasive Lobular Ca, LCIS.  Daughter with L breast mass.  Daughter with ovarian cancer hx.  BRCA 1 & 2, breast cancer gene, HH.  Negative.    She has pancreas mass, on previous study.      (4)Metastatic breast cancer.  She has decided against Rad Rx for paliation.    (5)She will begin Kisquali, letrozole daily at home.    (6)She prefers to defer further work up.  She wants D/C to [facility] and referral to hospice.  D/C today with hospice.    Discussed with Dr. Marcos.

## 2022-12-22 ENCOUNTER — TELEPHONE (OUTPATIENT)
Dept: HEMATOLOGY/ONCOLOGY | Facility: CLINIC | Age: 72
End: 2022-12-22

## 2022-12-22 LAB — BACTERIA BLD CULT: NORMAL

## 2022-12-23 NOTE — TELEPHONE ENCOUNTER
She was hospitalized and went home on hospice.    [facility]    Call her and family, get the hospice name.  I want her to take the Kisquali 3 daily x's 3 of 4 weeks.  And the letrozole 1 daily.    Ask hospice to give us an update on her in 3-4 weeks.

## 2022-12-27 NOTE — TELEPHONE ENCOUNTER
Spoke to [facility]. Stated that they did not have medications on med list because family originally denied the medications. Nurse stated she would ask the family again and follow up with me.

## 2022-12-29 NOTE — PROGRESS NOTES
[facility] in office hematology Oncology Subsequent  encounter note  12/6/22    Subjective:      Patient ID:   Leyda Bravo  72 y.o. female  1950  HEIDI Brink MD      Chief Complaint:   DVT    HPI:  72 y.o. female  had a near syncopal episode, 2 or 3 weeks later she was found to have extensive right and left leg DVT on May 17, 2021.   She also had shortness of breath symptoms, however I have not documented the finding of a pulmonary embolus.  She was treated with heparin intravenously and transitioned over to Eliquis 5 mg 1 p.o. b.i.d.  She took Eliquis x's 6 months.    She complains of low back pain chronically and x-ray study showed that she had some degenerative joint disease involving her lower lumbar vertebrae probably accounting for her symptoms.    Other findings included a 1.7 cm mass in the left breast and an 11 mm nodule in the pancreas and a small lung  Nodule of 3 mm in size.    Mammogram and U/S support that L breast nodule is benign, but nodule found at areola of R breast.  Bx of R breast mass  Showed Invasive lobular Carcinoma, LCIS, tumor 1.1 cm, grade 1, ERP+, PRP+,   H2N neg.    Discussed R lumpectomy, Sentinal node Bx and adjuvant Rad Rx.  Vs R mastectomy, Sentinal Node Bx.  Refer to Dr. Zac Odom of surgery.  For surgery 10/12/21.  BrCa1 & 2 Negative.  11/9/21.  She saw  Dr. Raphael no Rad Rx recommended.    Last seen 1/2022.  Referred back with recurrent metastatic dx.  Hip pain x's 2 months. L neck anterior cervical LN x's 1-2 months.  Dr. Street bx, metastatic breast cancer.  S/P R mastectomy.  RLL 12 mm nodule, metastatis dx?  Collected CBC, CMP, tumor markers and Foundation One Study.    Dr. Raphael plans Rad Rx for pain control, back and R hip areas.    Working on Kisqali Letrozole for Rx of metastatic dx.  [facility]    Later, after 1/1/23, begin Xgeva subq monthly.     Has history of colitis and had total colectomy in 2005, her  course was complicated by a pulmonary embolus.  She had hernia repair in 2016 and had complications with wound dehiscence and her course was complicated by a pulmonary embolus.  Other history includes Caesarean section x1, cholecystectomy, left nephrectomy for stones in , hernia repair x2.  She is a  3 para 3 miscarriage 0 individual    She she has history of hypertension, heart disease, arthritis in the knees hips and ankles.  Skin cancer has removed from the face in the past and from the shoulder area.  She has hypothyroidism.  Of    She smoked from age [age] for approximately 20 years, smoking 1 pack per day.  She does not drink alcohol with regularity.  She denies allergies to medications.  She worked as a garment .    Two of her sisters have had miscarriages, there is no family history of PE or DVT.  Family history does include heart disease, colitis, diabetes, renal disease.  She has a daughter who had ovarian cancer, a daughter who had anemia and her mom  had heart disease.    Her daughter with her today, reports she has a L breast lump.  She has a palpable mass at 12 o'clock of L breast.   Mamm and U/S.  Daughter Sam Tinajero 81 had breast bx, path pending.      ROS:   GEN: normal without any fever, night sweats or weight loss  HEENT: normal with no HA's, sore throat, stiff neck, changes in vision  CV: normal with no CP, SOB, PND, GROVE or orthopnea  PULM: normal with no SOB, cough, hemoptysis, sputum or pleuritic pain  GI: normal with no abdominal pain, nausea, vomiting, constipation, diarrhea, melanotic stools, BRBPR, or hematemesis  : normal with no hematuria, dysuria  BREAST: normal with no mass, discharge, pain  SKIN: normal with no rash, erythema, bruising, or swelling     Past Medical History:   Diagnosis Date    Breast cancer     Hypertension     Obese     Thyroid disease     hypothyroidism     Past Surgical History:   Procedure Laterality Date     SECTION       CHOLECYSTECTOMY      COLON SURGERY      colostomy    HERNIA REPAIR      KIDNEY SURGERY Left 2006    removal       Review of patient's allergies indicates:  No Known Allergies  Social History     Socioeconomic History    Marital status:    Tobacco Use    Smoking status: Never    Smokeless tobacco: Never   Substance and Sexual Activity    Alcohol use: No    Drug use: No         Current Outpatient Medications:     HYDROcodone-acetaminophen (NORCO) 7.5-325 mg per tablet, Take 1 tablet by mouth every 6 (six) hours as needed for Pain., Disp: 120 tablet, Rfl: 0    levothyroxine (SYNTHROID) 125 MCG tablet, Take 1 tablet (125 mcg total) by mouth once daily., Disp: 30 tablet, Rfl: 11    NAPROXEN SODIUM (ALEVE ORAL), Take 1 tablet by mouth 2 (two) times a day., Disp: , Rfl:     acetaminophen (TYLENOL) 500 MG tablet, Take 500 mg by mouth every 6 (six) hours as needed., Disp: , Rfl:     apixaban (ELIQUIS) 5 mg Tab, Take 5 mg by mouth 2 (two) times daily., Disp: , Rfl:     cyclobenzaprine (FLEXERIL) 5 MG tablet, Take 5 mg by mouth 2 (two) times daily as needed., Disp: , Rfl:     HYDROcodone-acetaminophen (NORCO)  mg per tablet, Take 1 tablet by mouth every 6 (six) hours as needed for Pain., Disp: 120 tablet, Rfl: 0    letrozole (FEMARA) 2.5 mg Tab, Take 1 tablet (2.5 mg total) by mouth once daily. (Patient not taking: Reported on 12/17/2022.), Disp: 30 tablet, Rfl: 6    mupirocin (BACTROBAN) 2 % ointment, Apply 1 g topically 2 (two) times daily., Disp: , Rfl:     ondansetron (ZOFRAN) 8 MG tablet, Take 1 tablet (8 mg total) by mouth every 8 (eight) hours as needed for Nausea., Disp: 30 tablet, Rfl: 2    ribociclib (KISQALI) 600 mg/day (200 mg x 3) Tab, Take 3 tablets (600 mg total) by mouth once daily for 3 weeks (21 days), take 1 week (7 days) off, repeat every 28 days (Patient not taking: Reported on 12/17/2022.), Disp: 63 tablet, Rfl: 6    traMADoL (ULTRAM) 50 mg tablet, Take 50 mg by mouth every 6 (six) hours as  "needed., Disp: , Rfl:           Objective:   Vitals:  Blood pressure 110/63, pulse 80, temperature 97.5 °F (36.4 °C), resp. rate 18, height 5' 2" (1.575 m).    Physical Examination:   GEN: no apparent distress, comfortable  HEAD: atraumatic and normocephalic  EYES: no pallor, no icterus  ENT: no pharyngeal erythema, external ears WNL; no nasal discharge  NECK: no masses, thyroid normal, trachea midline, no LAD/LN's, supple  CV: RRR with no murmur; normal pulse; normal S1 and S2; no pedal edema  CHEST: Normal respiratory effort; CTAB; normal breath sounds; no wheeze or crackles  ABDOM: nontender and nondistended; soft;  no rebound/guarding, L/S NP  MUSC/Skeletal: ROM normal; no crepitus; joints normal;   EXTREM:  She has extensive spider veins at the lower extremities, calves are nontender, without palpable venous cord, she does have 1+ edema bilaterally.  SKIN: no rashes, lesions, ulcers, petechiae   : no cvat  NEURO: grossly intact; motor/sensory WNL;  no tremors  PSYCH: normal mood, affect and behavior  LYMPH: normal cervical, supraclavicular, axillary and groin LN's  BREASTS:  Left and right breast are nontender, without discharge, and without palpable mass.    Labs:   Lab Results   Component Value Date    WBC 7.97 12/18/2022    HGB 13.0 12/18/2022    HCT 40.9 12/18/2022    MCV 97 12/18/2022     12/18/2022    CMP  Sodium   Date Value Ref Range Status   12/18/2022 137 136 - 145 mmol/L Final     Potassium   Date Value Ref Range Status   12/18/2022 4.1 3.5 - 5.1 mmol/L Final     Chloride   Date Value Ref Range Status   12/18/2022 100 95 - 110 mmol/L Final     CO2   Date Value Ref Range Status   12/18/2022 27 23 - 29 mmol/L Final     Glucose   Date Value Ref Range Status   12/18/2022 113 (H) 70 - 110 mg/dL Final     BUN   Date Value Ref Range Status   12/18/2022 7 (L) 8 - 23 mg/dL Final     Creatinine   Date Value Ref Range Status   12/18/2022 0.7 0.5 - 1.4 mg/dL Final     Calcium   Date Value Ref Range " Status   12/18/2022 9.1 8.7 - 10.5 mg/dL Final     Total Protein   Date Value Ref Range Status   12/17/2022 7.2 6.0 - 8.4 g/dL Final     Albumin   Date Value Ref Range Status   12/17/2022 3.4 (L) 3.5 - 5.2 g/dL Final     Total Bilirubin   Date Value Ref Range Status   12/17/2022 1.1 (H) 0.1 - 1.0 mg/dL Final     Comment:     For infants and newborns, interpretation of results should be based  on gestational age, weight and in agreement with clinical  observations.    Premature Infant recommended reference ranges:  Up to 24 hours.............<8.0 mg/dL  Up to 48 hours............<12.0 mg/dL  3-5 days..................<15.0 mg/dL  6-29 days.................<15.0 mg/dL       Alkaline Phosphatase   Date Value Ref Range Status   12/17/2022 101 55 - 135 U/L Final     AST   Date Value Ref Range Status   12/17/2022 17 10 - 40 U/L Final     ALT   Date Value Ref Range Status   12/17/2022 15 10 - 44 U/L Final     Anion Gap   Date Value Ref Range Status   12/18/2022 10 8 - 16 mmol/L Final     eGFR if    Date Value Ref Range Status   06/14/2021 >60.0 >60 mL/min/1.73 m^2 Final     eGFR if non    Date Value Ref Range Status   06/14/2021 >60.0 >60 mL/min/1.73 m^2 Final     Comment:     Calculation used to obtain the estimated glomerular filtration  rate (eGFR) is the CKD-EPI equation.      Recent lab from June 14, 2021 shows a white blood count of 9900, hemoglobin 12.2, hematocrit 37.5, MCV 97, platelet count 173,000. Differential is normal.  Creatinine is 0.9, albumin is 3.3, LFTs are normal.      She is hypercoagulation evaluation and 2017 showed that she was homozygous positive for MTHFR mutation and her homocystine level was increased at 22.  At that time she was started on Metanx daily.     A CTA runoff study was done June 14, 2021 at [facility].  This study showed vascular disease involving the aorta and pelvic and lower extremity arteries., fatty liver was seen, vena cava filter was in  place, prominence of pelvic veins was noted.  11 mm low-density mass in the body of the pancreas was seen.  Large right abdominal hernia was noted without obstruction.      Previous CT scan of lumbar spine show mild multilevel degeneration with facet arthropathy.  CT scan of the chest from June 8, 2021 showed bronchitis without pulmonary embolus being seen.  A 17 mm nodule was noted in the left breast.    Lower extremity venous study showed acute occlusive deep vein thrombosis bilaterally with clot in the common and superficial femoral veins, popliteal vein, extending into the calves bilaterally.  Right greater saphenous vein was occluded.      Partial Mastectomy 2.3 cm lobular invasive Ca, grade 2 dx, 1/1 sentinal LN negative, possitive margin.    Mastectomy, no residual dx.    ERP+, PRP+, H2N negative.    Stage 2 Dx by primary size.    Submit for Oncotype Dx testing regards adjuvant hormonal and chemotherapy.      Assessment:   (1) 72 y.o. female with diagnosis of extensive bilateral DVT, timely related to a previous fall.  Presently treated with Eliquis 5 mg p.o. b.i.d., clinically improving with decrease symptoms and decreased physical findings at the left and right leg.  History of pulmonary emboli in the past following surgical procedures.    (2) previous hypercoagulation evaluation showed she was double homozygous positive with MTHFR mutation and homocystine level was increased at 22. I started her on Metanx at that time, and last saw her in follow-up in 2017.     She is on Eliquis b.i.d. at this time.  I plan to repeat her venous Doppler studies of the legs mid 9/2021  to confirm that the clot has resolved. And check CTA of chest now.   Because of her history of recurrence clots, I would plan to maintain her on Eliquis chronically as prophylaxis against clot events.    (3)R breast Invasive Lobular Ca, LCIS.  Daughter with L breast mass.  Daughter with ovarian cancer hx.  BRCA 1 & 2, breast cancer gene, HH.   Negative.    She has pancreas mass, on previous study.      (4)Metastatic breast cancer.  Dr. Raphael for Rad Rx. To areas of bone pain or impending Fx risk.    (5)Treatment option of Letrozole,  Riky discussed with her.  Xgeva monthly after Rad RX completed, after 1/1/23.    RTC 12/21/22 week.

## 2023-01-03 ENCOUNTER — SPECIALTY PHARMACY (OUTPATIENT)
Dept: PHARMACY | Facility: CLINIC | Age: 73
End: 2023-01-03
Payer: MEDICARE

## 2023-01-03 NOTE — TELEPHONE ENCOUNTER
Outgoing call to pt for refill of Kisqali and letrozole. Pt stated she was not sure if it was time for refill, but would check with her daughter and call us back.

## 2023-01-09 NOTE — TELEPHONE ENCOUNTER
Outgoing call to pt about Kisqali and Letrozole refill. Per pt and pts daughter, pt no longer taking. Closing out at [facility]. Informed pt to reach out if anything changes.

## 2023-01-22 ENCOUNTER — DOCUMENTATION ONLY (OUTPATIENT)
Dept: HEMATOLOGY/ONCOLOGY | Facility: CLINIC | Age: 73
End: 2023-01-22

## 2024-02-01 NOTE — TELEPHONE ENCOUNTER
Scheduled date of colonoscopy (as of today): 2/14/24   Physician performing colonoscopy: Mk  Location of colonoscopy: [facility]  Bowel prep reviewed with patient: Golytely  Instructions reviewed with patient by: Radha  Clearances: Plavix Dr Pereira   Tell pt that I don't see any recent lab or homocystene level.  When and where did she have it done?